# Patient Record
Sex: FEMALE | Race: WHITE | NOT HISPANIC OR LATINO | Employment: OTHER | URBAN - METROPOLITAN AREA
[De-identification: names, ages, dates, MRNs, and addresses within clinical notes are randomized per-mention and may not be internally consistent; named-entity substitution may affect disease eponyms.]

---

## 2017-01-16 ENCOUNTER — HOSPITAL ENCOUNTER (OUTPATIENT)
Dept: MAMMOGRAPHY | Facility: HOSPITAL | Age: 57
Discharge: HOME | End: 2017-01-16
Admitting: INTERNAL MEDICINE

## 2017-01-16 DIAGNOSIS — Z12.39 SCREENING BREAST EXAMINATION: ICD-10-CM

## 2017-01-16 PROCEDURE — G0202 SCR MAMMO BI INCL CAD: HCPCS

## 2017-06-05 ENCOUNTER — OFFICE VISIT (OUTPATIENT)
Dept: OBSTETRICS AND GYNECOLOGY | Age: 57
End: 2017-06-05

## 2017-06-05 VITALS
SYSTOLIC BLOOD PRESSURE: 118 MMHG | HEIGHT: 65 IN | BODY MASS INDEX: 25.66 KG/M2 | DIASTOLIC BLOOD PRESSURE: 70 MMHG | WEIGHT: 154 LBS

## 2017-06-05 DIAGNOSIS — Z01.419 ENCOUNTER FOR GYNECOLOGICAL EXAMINATION: Primary | ICD-10-CM

## 2017-06-05 DIAGNOSIS — N95.2 ATROPHIC VAGINITIS: ICD-10-CM

## 2017-06-05 LAB
BILIRUB BLD-MCNC: NEGATIVE MG/DL
CLARITY, POC: CLEAR
COLOR UR: YELLOW
GLUCOSE UR STRIP-MCNC: NEGATIVE MG/DL
KETONES UR QL: NEGATIVE
LEUKOCYTE EST, POC: NEGATIVE
NITRITE UR-MCNC: NEGATIVE MG/ML
PH UR: 6 [PH] (ref 5–8)
PROT UR STRIP-MCNC: NEGATIVE MG/DL
RBC # UR STRIP: NEGATIVE /UL
SP GR UR: 1.02 (ref 1–1.03)
UROBILINOGEN UR QL: NORMAL

## 2017-06-05 PROCEDURE — 81002 URINALYSIS NONAUTO W/O SCOPE: CPT | Performed by: OBSTETRICS & GYNECOLOGY

## 2017-06-05 PROCEDURE — 99396 PREV VISIT EST AGE 40-64: CPT | Performed by: OBSTETRICS & GYNECOLOGY

## 2017-06-05 NOTE — PROGRESS NOTES
"Subjective   Chief Complaint   Patient presents with   • Gynecologic Exam     Annual      History of Present Illness    Poly Molina is a very pleasant  57 y.o. female who presents for annual exam.  , Mammo Exam2016 [facility] , Contraception pm, Exercise 3-5 x wkly.Patient notices a little bit of decrease in lubrication over the past several years but does not want any medical treatment for that.  She had her colonoscopy last December, is exercising regularly, up-to-date on her wellness labs    Obstetric History:  OB History      Para Term  AB TAB SAB Ectopic Multiple Living    2 2 2                Menstrual History:     No LMP recorded. Patient is postmenopausal.       Sexual History:       Past Medical History:   Diagnosis Date   • Fibrocystic breast    • Increased prolactin level      Past Surgical History:   Procedure Laterality Date   • COLONOSCOPY N/A 2016    Procedure: COLONOSCOPY INTO CECUM;  Surgeon: Savanna Roberts MD;  Location: [facility] ENDOSCOPY;  Service:    • FOREARM SURGERY Right    • KNEE ACL RECONSTRUCTION Right        SOCIAL Hx:      The following portions of the patient's history were reviewed and updated as appropriate: allergies, current medications, past family history, past medical history, past social history, past surgical history and problem list.    Review of Systems        Except as outlined in history of physical illness, patient denies any changes in her GYN, , GI systems.  All other systems reviewed are negative         Objective   Physical Exam    /70  Ht 64.5\" (163.8 cm)  Wt 154 lb (69.9 kg)  BMI 26.03 kg/m2    General: Patient is alert and oriented and appears overall healthy  Neck: Is supple without thyromegaly, no carotid bruits and no lymphadenopathy  Lungs: Clear bilaterally, no wheezing, rhonchi, or rales.  Respiratory rate is normal  Breast: Even symmetrical, no lymphadenopathy, no retraction, no masses appreciated on either side  Heart: " Regular rate and rhythm are appreciated, no murmurs or rubs are heard  Abdomen: Is soft, without organomegaly, bowel sounds are positive, there is no                                rebound or guarding and palpation does not produce any discomfort  Back: Nontender without CVA tenderness  Pelvic: External genitalia appear normal and consistent with mature female.  BUS normal                            Vagina is clean dry without discharge and appears marginally estrogenized, no               lesions or masses are present                         Cervix is noninflamed without discharge or lesions.  There is no cervical motion             tenderness.                Uterus is nonenlarged, without tenderness, and no masses or abnormalities are  present               Adnexa are non-enlarged, non tender               Rectal exam reveals adequate sphincter tone and no masses or lesions are                     appreciated on digital rectal examination.     There is no problem list on file for this patient.               Assessment/Plan   Poly was seen today for gynecologic exam.    Diagnoses and all orders for this visit:    Encounter for gynecological examination  -     IGP, Apt HPV,rfx 16 / 18,45    Atrophic vaginitis  Will use Replens vaginal lubricant declines any other treatment at this stage    Annual Well Woman Exam    Discussed today's findings and concerns with patient in detail.  Continue to recommend regular exercise to include cardiovascular and resistance training and breast self-exam. Wellness lab, mammography, & pap smear, in accordance with age guidelines.  Dietary and caloric recommendations were discussed.    Frequency of bone density testing as well as colonoscopy given the patient's family and past history were reviewed.    All of the patient's questions were addressed and answered, I have encouraged her to call for today's test results if she has not received them within 10 days.  Patient is advised to  call with any change in her condition or with any other questions, otherwise return in 12 months for annual examination.           EMR Dragon/ Transcription disclaimer:  Much of the encounter note is an electronic transcription/translation of spoken language to printed text. The electronic translation of spoken language may permit erroneous, or at times, nonessential words or phrases to be inadvertently transcribes; Although i have reviewed the note for such errors, some may still exist.

## 2017-06-08 LAB
CYTOLOGIST CVX/VAG CYTO: NORMAL
CYTOLOGY CVX/VAG DOC THIN PREP: NORMAL
DX ICD CODE: NORMAL
HIV 1 & 2 AB SER-IMP: NORMAL
HPV I/H RISK 4 DNA CVX QL PROBE+SIG AMP: NEGATIVE
OTHER STN SPEC: NORMAL
PATH REPORT.FINAL DX SPEC: NORMAL
STAT OF ADQ CVX/VAG CYTO-IMP: NORMAL

## 2017-12-15 ENCOUNTER — LAB (OUTPATIENT)
Dept: LAB | Facility: HOSPITAL | Age: 57
End: 2017-12-15

## 2017-12-15 ENCOUNTER — TRANSCRIBE ORDERS (OUTPATIENT)
Dept: ADMINISTRATIVE | Facility: HOSPITAL | Age: 57
End: 2017-12-15

## 2017-12-15 DIAGNOSIS — Z00.00 ROUTINE GENERAL MEDICAL EXAMINATION AT A HEALTH CARE FACILITY: ICD-10-CM

## 2017-12-15 DIAGNOSIS — Z00.00 ROUTINE GENERAL MEDICAL EXAMINATION AT A HEALTH CARE FACILITY: Primary | ICD-10-CM

## 2017-12-15 LAB
ALBUMIN SERPL-MCNC: 4.1 G/DL (ref 3.5–5.2)
ALBUMIN/GLOB SERPL: 1.2 G/DL
ALP SERPL-CCNC: 62 U/L (ref 39–117)
ALT SERPL W P-5'-P-CCNC: 19 U/L (ref 1–33)
ANION GAP SERPL CALCULATED.3IONS-SCNC: 10.1 MMOL/L
AST SERPL-CCNC: 19 U/L (ref 1–32)
BACTERIA UR QL AUTO: ABNORMAL /HPF
BASOPHILS # BLD AUTO: 0.04 10*3/MM3 (ref 0–0.2)
BASOPHILS NFR BLD AUTO: 0.6 % (ref 0–1.5)
BILIRUB SERPL-MCNC: 0.3 MG/DL (ref 0.1–1.2)
BILIRUB UR QL STRIP: NEGATIVE
BUN BLD-MCNC: 14 MG/DL (ref 6–20)
BUN/CREAT SERPL: 16.9 (ref 7–25)
CALCIUM SPEC-SCNC: 9.6 MG/DL (ref 8.6–10.5)
CHLORIDE SERPL-SCNC: 103 MMOL/L (ref 98–107)
CHOLEST SERPL-MCNC: 225 MG/DL (ref 0–200)
CLARITY UR: CLEAR
CO2 SERPL-SCNC: 29.9 MMOL/L (ref 22–29)
COLOR UR: YELLOW
CREAT BLD-MCNC: 0.83 MG/DL (ref 0.57–1)
DEPRECATED RDW RBC AUTO: 44 FL (ref 37–54)
EOSINOPHIL # BLD AUTO: 0.16 10*3/MM3 (ref 0–0.7)
EOSINOPHIL NFR BLD AUTO: 2.4 % (ref 0.3–6.2)
ERYTHROCYTE [DISTWIDTH] IN BLOOD BY AUTOMATED COUNT: 13.2 % (ref 11.7–13)
GFR SERPL CREATININE-BSD FRML MDRD: 71 ML/MIN/1.73
GLOBULIN UR ELPH-MCNC: 3.4 GM/DL
GLUCOSE BLD-MCNC: 99 MG/DL (ref 65–99)
GLUCOSE UR STRIP-MCNC: NEGATIVE MG/DL
HCT VFR BLD AUTO: 42.2 % (ref 35.6–45.5)
HDLC SERPL-MCNC: 75 MG/DL (ref 40–60)
HGB BLD-MCNC: 13 G/DL (ref 11.9–15.5)
HGB UR QL STRIP.AUTO: NEGATIVE
HYALINE CASTS UR QL AUTO: ABNORMAL /LPF
IMM GRANULOCYTES # BLD: 0 10*3/MM3 (ref 0–0.03)
IMM GRANULOCYTES NFR BLD: 0 % (ref 0–0.5)
KETONES UR QL STRIP: NEGATIVE
LDLC SERPL CALC-MCNC: 138 MG/DL (ref 0–100)
LDLC/HDLC SERPL: 1.85 {RATIO}
LEUKOCYTE ESTERASE UR QL STRIP.AUTO: ABNORMAL
LYMPHOCYTES # BLD AUTO: 2.34 10*3/MM3 (ref 0.9–4.8)
LYMPHOCYTES NFR BLD AUTO: 35.5 % (ref 19.6–45.3)
MCH RBC QN AUTO: 28.4 PG (ref 26.9–32)
MCHC RBC AUTO-ENTMCNC: 30.8 G/DL (ref 32.4–36.3)
MCV RBC AUTO: 92.1 FL (ref 80.5–98.2)
MONOCYTES # BLD AUTO: 0.63 10*3/MM3 (ref 0.2–1.2)
MONOCYTES NFR BLD AUTO: 9.5 % (ref 5–12)
NEUTROPHILS # BLD AUTO: 3.43 10*3/MM3 (ref 1.9–8.1)
NEUTROPHILS NFR BLD AUTO: 52 % (ref 42.7–76)
NITRITE UR QL STRIP: NEGATIVE
PH UR STRIP.AUTO: 6 [PH] (ref 5–8)
PLATELET # BLD AUTO: 317 10*3/MM3 (ref 140–500)
PMV BLD AUTO: 9.6 FL (ref 6–12)
POTASSIUM BLD-SCNC: 4.2 MMOL/L (ref 3.5–5.2)
PROCALCITONIN SERPL-MCNC: 0.04 NG/ML (ref 0.1–0.25)
PROT SERPL-MCNC: 7.5 G/DL (ref 6–8.5)
PROT UR QL STRIP: ABNORMAL
RBC # BLD AUTO: 4.58 10*6/MM3 (ref 3.9–5.2)
RBC # UR: ABNORMAL /HPF
REF LAB TEST METHOD: ABNORMAL
SODIUM BLD-SCNC: 143 MMOL/L (ref 136–145)
SP GR UR STRIP: 1.02 (ref 1–1.03)
SQUAMOUS #/AREA URNS HPF: ABNORMAL /HPF
TRIGL SERPL-MCNC: 58 MG/DL (ref 0–150)
TSH SERPL DL<=0.05 MIU/L-ACNC: 2.41 MIU/ML (ref 0.27–4.2)
UROBILINOGEN UR QL STRIP: ABNORMAL
VLDLC SERPL-MCNC: 11.6 MG/DL (ref 5–40)
WBC NRBC COR # BLD: 6.6 10*3/MM3 (ref 4.5–10.7)
WBC UR QL AUTO: ABNORMAL /HPF

## 2017-12-15 PROCEDURE — 85025 COMPLETE CBC W/AUTO DIFF WBC: CPT | Performed by: INTERNAL MEDICINE

## 2017-12-15 PROCEDURE — 81001 URINALYSIS AUTO W/SCOPE: CPT | Performed by: INTERNAL MEDICINE

## 2017-12-15 PROCEDURE — 80053 COMPREHEN METABOLIC PANEL: CPT | Performed by: INTERNAL MEDICINE

## 2017-12-15 PROCEDURE — 84145 PROCALCITONIN (PCT): CPT | Performed by: INTERNAL MEDICINE

## 2017-12-15 PROCEDURE — 80061 LIPID PANEL: CPT | Performed by: INTERNAL MEDICINE

## 2017-12-15 PROCEDURE — 36415 COLL VENOUS BLD VENIPUNCTURE: CPT

## 2017-12-15 PROCEDURE — 84443 ASSAY THYROID STIM HORMONE: CPT | Performed by: INTERNAL MEDICINE

## 2017-12-19 ENCOUNTER — LAB (OUTPATIENT)
Dept: LAB | Facility: HOSPITAL | Age: 57
End: 2017-12-19

## 2017-12-19 ENCOUNTER — TRANSCRIBE ORDERS (OUTPATIENT)
Dept: ADMINISTRATIVE | Facility: HOSPITAL | Age: 57
End: 2017-12-19

## 2017-12-19 DIAGNOSIS — E22.1 HYPERPROLACTINEMIA (HCC): Primary | ICD-10-CM

## 2017-12-19 DIAGNOSIS — E22.1 HYPERPROLACTINEMIA (HCC): ICD-10-CM

## 2017-12-19 LAB — PROLACTIN SERPL-MCNC: 18.2 NG/ML (ref 4.79–23.3)

## 2017-12-19 PROCEDURE — 84146 ASSAY OF PROLACTIN: CPT | Performed by: INTERNAL MEDICINE

## 2017-12-19 PROCEDURE — 36415 COLL VENOUS BLD VENIPUNCTURE: CPT

## 2017-12-21 ENCOUNTER — TRANSCRIBE ORDERS (OUTPATIENT)
Dept: ADMINISTRATIVE | Facility: HOSPITAL | Age: 57
End: 2017-12-21

## 2017-12-21 DIAGNOSIS — Z12.39 SCREENING BREAST EXAMINATION: Primary | ICD-10-CM

## 2018-01-03 ENCOUNTER — HOSPITAL ENCOUNTER (OUTPATIENT)
Dept: MAMMOGRAPHY | Facility: HOSPITAL | Age: 58
Discharge: HOME | End: 2018-01-03
Admitting: INTERNAL MEDICINE

## 2018-01-03 DIAGNOSIS — Z12.39 SCREENING BREAST EXAMINATION: ICD-10-CM

## 2018-01-03 PROCEDURE — 77067 SCR MAMMO BI INCL CAD: CPT

## 2018-06-11 ENCOUNTER — OFFICE VISIT (OUTPATIENT)
Dept: OBSTETRICS AND GYNECOLOGY | Age: 58
End: 2018-06-11

## 2018-06-11 VITALS
HEIGHT: 65 IN | SYSTOLIC BLOOD PRESSURE: 120 MMHG | DIASTOLIC BLOOD PRESSURE: 72 MMHG | BODY MASS INDEX: 26.82 KG/M2 | WEIGHT: 161 LBS

## 2018-06-11 DIAGNOSIS — Z01.419 ENCOUNTER FOR GYNECOLOGICAL EXAMINATION: Primary | ICD-10-CM

## 2018-06-11 PROCEDURE — 99396 PREV VISIT EST AGE 40-64: CPT | Performed by: OBSTETRICS & GYNECOLOGY

## 2018-06-11 NOTE — PROGRESS NOTES
"Subjective   Chief Complaint   Patient presents with   • Gynecologic Exam     Annual:last mammo 2017      History of Present Illness    Poly Molina is a very pleasant  58 y.o. female who presents for annual exam.  , Mammo Exam 2017 within normal limits, Contraception post menopausal, Exercise 6 times a week  Patient has no gynecological concerns or complaints. She is up-to-date on her colonoscopy, she is up-to-date on her wellness labs. We discussed some of her dietary choices, overall she is doing well having a little difficulty limiting in her high glycemic foods and simple carbs.    Obstetric History:  OB History      Para Term  AB Living    2 2 2          SAB TAB Ectopic Molar Multiple Live Births                        Menstrual History:     No LMP recorded. Patient is postmenopausal.       Sexual History:       Past Medical History:   Diagnosis Date   • Fibrocystic breast    • Increased prolactin level      Past Surgical History:   Procedure Laterality Date   • COLONOSCOPY N/A 2016    Procedure: COLONOSCOPY INTO CECUM;  Surgeon: Savanna Roberts MD;  Location: [facility];  Service:    • FOREARM SURGERY Right    • KNEE ACL RECONSTRUCTION Right        SOCIAL Hx:      The following portions of the patient's history were reviewed and updated as appropriate: allergies, current medications, past family history, past medical history, past social history, past surgical history and problem list.    Review of Systems        Except as outlined in history of physical illness, patient denies any changes in her GYN, , GI systems.  All other systems reviewed are negative         Objective   Physical Exam    /72   Ht 163.8 cm (64.5\")   Wt 73 kg (161 lb)   BMI 27.21 kg/m²     General: Patient is alert and oriented and appears overall healthy  Neck: Is supple without thyromegaly, no carotid bruits and no lymphadenopathy  Lungs: Clear bilaterally, no wheezing, " rhonchi, or rales.  Respiratory rate is normal  Breast: Even symmetrical, no lymphadenopathy, no retraction, no masses appreciated on either side  Heart: Regular rate and rhythm are appreciated, no murmurs or rubs are heard  Abdomen: Is soft, without organomegaly, bowel sounds are positive, there is no                                rebound or guarding and palpation does not produce any discomfort  Back: Nontender without CVA tenderness  Pelvic: External genitalia appear normal and consistent with mature female.  BUS normal              Urethra appears normal and without mass, bladder is nontender and without any               Masses.               Vagina is clean dry without discharge and appears adequately estrogenized, no               lesions or masses are present                         Cervix is noninflamed without discharge or lesions.  There is no cervical motion             tenderness.                Uterus is nonenlarged, without tenderness, and no masses or abnormalities are  present               Adnexa are non-enlarged, non tender               Rectal exam reveals adequate sphincter tone and no masses or lesions are                     appreciated on digital rectal examination.     There is no problem list on file for this patient.               Assessment/Plan   Poly was seen today for gynecologic exam.    Diagnoses and all orders for this visit:    Encounter for gynecological examination  -     IGP, Apt HPV,rfx 16 / 18,45 - ThinPrep Vial, Cervix        Annual Well Woman Exam    Discussed today's findings and concerns with patient in detail.  Continue to recommend regular exercise to include cardiovascular and resistance training and breast self-exam. Wellness lab, mammography, & pap smear, in accordance with age guidelines.  Dietary and caloric recommendations were discussed.        All of the patient's questions were addressed and answered, I have encouraged her to call for today's test results if  she has not received them within 10 days.  Patient is advised to call with any change in her condition or with any other questions, otherwise return in 12 months for annual examination.

## 2018-06-14 LAB
CYTOLOGIST CVX/VAG CYTO: NORMAL
CYTOLOGY CVX/VAG DOC THIN PREP: NORMAL
DX ICD CODE: NORMAL
HIV 1 & 2 AB SER-IMP: NORMAL
HPV I/H RISK 4 DNA CVX QL PROBE+SIG AMP: NEGATIVE
Lab: NORMAL
OTHER STN SPEC: NORMAL
PATH REPORT.FINAL DX SPEC: NORMAL
STAT OF ADQ CVX/VAG CYTO-IMP: NORMAL

## 2019-01-29 ENCOUNTER — LAB (OUTPATIENT)
Dept: LAB | Facility: HOSPITAL | Age: 59
End: 2019-01-29

## 2019-01-29 ENCOUNTER — TRANSCRIBE ORDERS (OUTPATIENT)
Dept: ADMINISTRATIVE | Facility: HOSPITAL | Age: 59
End: 2019-01-29

## 2019-01-29 DIAGNOSIS — E22.1 IDIOPATHIC HYPERPROLACTINEMIA (HCC): ICD-10-CM

## 2019-01-29 DIAGNOSIS — Z00.00 ROUTINE GENERAL MEDICAL EXAMINATION AT A HEALTH CARE FACILITY: ICD-10-CM

## 2019-01-29 DIAGNOSIS — Z00.00 ROUTINE GENERAL MEDICAL EXAMINATION AT A HEALTH CARE FACILITY: Primary | ICD-10-CM

## 2019-01-29 LAB
ALBUMIN SERPL-MCNC: 4.4 G/DL (ref 3.5–5.2)
ALBUMIN/GLOB SERPL: 1.6 G/DL
ALP SERPL-CCNC: 66 U/L (ref 39–117)
ALT SERPL W P-5'-P-CCNC: 13 U/L (ref 1–33)
ANION GAP SERPL CALCULATED.3IONS-SCNC: 9.4 MMOL/L
AST SERPL-CCNC: 14 U/L (ref 1–32)
BASOPHILS # BLD AUTO: 0.02 10*3/MM3 (ref 0–0.2)
BASOPHILS NFR BLD AUTO: 0.3 % (ref 0–1.5)
BILIRUB SERPL-MCNC: 0.3 MG/DL (ref 0.1–1.2)
BILIRUB UR QL STRIP: NEGATIVE
BUN BLD-MCNC: 14 MG/DL (ref 8–23)
BUN/CREAT SERPL: 17.5 (ref 7–25)
CALCIUM SPEC-SCNC: 9.7 MG/DL (ref 8.6–10.5)
CHLORIDE SERPL-SCNC: 100 MMOL/L (ref 98–107)
CHOLEST SERPL-MCNC: 223 MG/DL (ref 0–200)
CLARITY UR: CLEAR
CO2 SERPL-SCNC: 28.6 MMOL/L (ref 22–29)
COLOR UR: YELLOW
CREAT BLD-MCNC: 0.8 MG/DL (ref 0.57–1)
DEPRECATED RDW RBC AUTO: 44.4 FL (ref 37–54)
EOSINOPHIL # BLD AUTO: 0.08 10*3/MM3 (ref 0–0.7)
EOSINOPHIL NFR BLD AUTO: 1.1 % (ref 0.3–6.2)
ERYTHROCYTE [DISTWIDTH] IN BLOOD BY AUTOMATED COUNT: 13.2 % (ref 11.7–13)
GFR SERPL CREATININE-BSD FRML MDRD: 73 ML/MIN/1.73
GLOBULIN UR ELPH-MCNC: 2.8 GM/DL
GLUCOSE BLD-MCNC: 92 MG/DL (ref 65–99)
GLUCOSE UR STRIP-MCNC: NEGATIVE MG/DL
HCT VFR BLD AUTO: 41.2 % (ref 35.6–45.5)
HDLC SERPL-MCNC: 71 MG/DL (ref 40–60)
HGB BLD-MCNC: 12.7 G/DL (ref 11.9–15.5)
HGB UR QL STRIP.AUTO: NEGATIVE
IMM GRANULOCYTES # BLD AUTO: 0.02 10*3/MM3 (ref 0–0.03)
IMM GRANULOCYTES NFR BLD AUTO: 0.3 % (ref 0–0.5)
KETONES UR QL STRIP: NEGATIVE
LDLC SERPL CALC-MCNC: 139 MG/DL (ref 0–100)
LDLC/HDLC SERPL: 1.96 {RATIO}
LEUKOCYTE ESTERASE UR QL STRIP.AUTO: NEGATIVE
LYMPHOCYTES # BLD AUTO: 2.2 10*3/MM3 (ref 0.9–4.8)
LYMPHOCYTES NFR BLD AUTO: 31.3 % (ref 19.6–45.3)
MCH RBC QN AUTO: 28.6 PG (ref 26.9–32)
MCHC RBC AUTO-ENTMCNC: 30.8 G/DL (ref 32.4–36.3)
MCV RBC AUTO: 92.8 FL (ref 80.5–98.2)
MONOCYTES # BLD AUTO: 0.5 10*3/MM3 (ref 0.2–1.2)
MONOCYTES NFR BLD AUTO: 7.1 % (ref 5–12)
NEUTROPHILS # BLD AUTO: 4.21 10*3/MM3 (ref 1.9–8.1)
NEUTROPHILS NFR BLD AUTO: 59.9 % (ref 42.7–76)
NITRITE UR QL STRIP: NEGATIVE
PH UR STRIP.AUTO: 7.5 [PH] (ref 5–8)
PLATELET # BLD AUTO: 359 10*3/MM3 (ref 140–500)
PMV BLD AUTO: 9 FL (ref 6–12)
POTASSIUM BLD-SCNC: 4 MMOL/L (ref 3.5–5.2)
PROLACTIN SERPL-MCNC: 15.22 NG/ML (ref 4.79–23.3)
PROT SERPL-MCNC: 7.2 G/DL (ref 6–8.5)
PROT UR QL STRIP: NEGATIVE
RBC # BLD AUTO: 4.44 10*6/MM3 (ref 3.9–5.2)
SODIUM BLD-SCNC: 138 MMOL/L (ref 136–145)
SP GR UR STRIP: 1.01 (ref 1–1.03)
TRIGL SERPL-MCNC: 63 MG/DL (ref 0–150)
UROBILINOGEN UR QL STRIP: NORMAL
VLDLC SERPL-MCNC: 12.6 MG/DL (ref 5–40)
WBC NRBC COR # BLD: 7.03 10*3/MM3 (ref 4.5–10.7)

## 2019-01-29 PROCEDURE — 85025 COMPLETE CBC W/AUTO DIFF WBC: CPT | Performed by: INTERNAL MEDICINE

## 2019-01-29 PROCEDURE — 84146 ASSAY OF PROLACTIN: CPT | Performed by: INTERNAL MEDICINE

## 2019-01-29 PROCEDURE — 80053 COMPREHEN METABOLIC PANEL: CPT | Performed by: INTERNAL MEDICINE

## 2019-01-29 PROCEDURE — 80061 LIPID PANEL: CPT | Performed by: INTERNAL MEDICINE

## 2019-01-29 PROCEDURE — 36415 COLL VENOUS BLD VENIPUNCTURE: CPT

## 2019-01-29 PROCEDURE — 81003 URINALYSIS AUTO W/O SCOPE: CPT | Performed by: INTERNAL MEDICINE

## 2019-06-19 ENCOUNTER — OFFICE VISIT (OUTPATIENT)
Dept: OBSTETRICS AND GYNECOLOGY | Age: 59
End: 2019-06-19

## 2019-06-19 VITALS
BODY MASS INDEX: 25.49 KG/M2 | SYSTOLIC BLOOD PRESSURE: 120 MMHG | DIASTOLIC BLOOD PRESSURE: 72 MMHG | WEIGHT: 153 LBS | HEIGHT: 65 IN

## 2019-06-19 DIAGNOSIS — Z01.419 ENCOUNTER FOR GYNECOLOGICAL EXAMINATION: Primary | ICD-10-CM

## 2019-06-19 PROCEDURE — 99396 PREV VISIT EST AGE 40-64: CPT | Performed by: OBSTETRICS & GYNECOLOGY

## 2019-06-19 NOTE — PROGRESS NOTES
"Subjective   Chief Complaint   Patient presents with   • Gynecologic Exam     Annual:last pap 2018,mammo 2018,colonoscopy       History of Present Illness    Poly Molina is a very pleasant  59 y.o. female who presents for annual exam.  , Mammo Exam patient is planning to schedule later this summer, , Exercise 6 times a week including resistance and mostly cardio  Patient is postmenopausal has no gynecological concerns or complaints she is up-to-date on her colonoscopy and is getting her wellness labs with her PCP.    Obstetric History:  OB History      Para Term  AB Living    2 2 2          SAB TAB Ectopic Molar Multiple Live Births                        Menstrual History:     No LMP recorded. Patient is postmenopausal.       Sexual History:       Past Medical History:   Diagnosis Date   • Fibrocystic breast    • Increased prolactin level (CMS/HCC)      Past Surgical History:   Procedure Laterality Date   • COLONOSCOPY N/A 2016    Procedure: COLONOSCOPY INTO CECUM;  Surgeon: Savanna Roberts MD;  Location: [facility] ENDOSCOPY;  Service:    • FOREARM SURGERY Right    • KNEE ACL RECONSTRUCTION Right        SOCIAL Hx:      The following portions of the patient's history were reviewed and updated as appropriate: allergies, current medications, past family history, past medical history, past social history, past surgical history and problem list.    Review of Systems        Except as outlined in history of physical illness, patient denies any changes in her GYN, , GI systems.  All other systems reviewed are negative         Objective   Physical Exam    /72   Ht 163.8 cm (64.5\")   Wt 69.4 kg (153 lb)   BMI 25.86 kg/m²     General: Patient is alert and oriented and appears overall healthy  Neck: Is supple without thyromegaly, no carotid bruits and no lymphadenopathy  Lungs: Clear bilaterally, no wheezing, rhonchi, or rales.  Respiratory rate is normal  Breast: Even " symmetrical, no lymphadenopathy, no retraction, no masses appreciated on either side  Heart: Regular rate and rhythm are appreciated, no murmurs or rubs are heard  Abdomen: Is soft, without organomegaly, bowel sounds are positive, there is no                                rebound or guarding and palpation does not produce any discomfort  Back: Nontender without CVA tenderness  Pelvic: External genitalia appear normal and consistent with mature female.  BUS normal              Urethra appears normal and without mass, bladder is nontender and without any               Masses.               Vagina is clean dry without discharge and appears adequately estrogenized, no               lesions or masses are present                         Cervix is noninflamed without discharge or lesions.  There is no cervical motion             tenderness.                Uterus is nonenlarged, without tenderness, and no masses or abnormalities are  present               Adnexa are non-enlarged, non tender               Rectal exam reveals adequate sphincter tone and no masses or lesions are                     appreciated on digital rectal examination.      Annual Well Woman Exam     There is no problem list on file for this patient.               Assessment/Plan   Poly was seen today for gynecologic exam.    Diagnoses and all orders for this visit:    Encounter for gynecological examination  -     IGP, Apt HPV,rfx 16 / 18,45          Discussed today's findings and concerns with patient.  Continue to recommend regular exercise including cardiovascular and resistance training as well as  breast self-exam. Wellness lab, mammography, & pap smear, in accordance with age guidelines.        All of the patient's questions were addressed and answered, I have encouraged her to call for today's test results if she has not received them within 10 days.  Patient is advised to call with any change in her condition or with any other questions,  otherwise return in 12 months for annual examination.

## 2019-06-21 LAB
CYTOLOGIST CVX/VAG CYTO: NORMAL
CYTOLOGY CVX/VAG DOC CYTO: NORMAL
CYTOLOGY CVX/VAG DOC THIN PREP: NORMAL
DX ICD CODE: NORMAL
HIV 1 & 2 AB SER-IMP: NORMAL
HPV I/H RISK 4 DNA CVX QL PROBE+SIG AMP: NEGATIVE
OTHER STN SPEC: NORMAL
STAT OF ADQ CVX/VAG CYTO-IMP: NORMAL

## 2019-11-13 ENCOUNTER — LAB (OUTPATIENT)
Dept: LAB | Facility: HOSPITAL | Age: 59
End: 2019-11-13

## 2019-11-13 ENCOUNTER — TRANSCRIBE ORDERS (OUTPATIENT)
Dept: ADMINISTRATIVE | Facility: HOSPITAL | Age: 59
End: 2019-11-13

## 2019-11-13 DIAGNOSIS — E78.5 HYPERLIPIDEMIA, UNSPECIFIED HYPERLIPIDEMIA TYPE: ICD-10-CM

## 2019-11-13 DIAGNOSIS — E78.5 HYPERLIPIDEMIA, UNSPECIFIED HYPERLIPIDEMIA TYPE: Primary | ICD-10-CM

## 2019-11-13 LAB
ALBUMIN SERPL-MCNC: 4.4 G/DL (ref 3.5–5.2)
ALBUMIN/GLOB SERPL: 1.6 G/DL
ALP SERPL-CCNC: 67 U/L (ref 39–117)
ALT SERPL W P-5'-P-CCNC: 7 U/L (ref 1–33)
ANION GAP SERPL CALCULATED.3IONS-SCNC: 11.6 MMOL/L (ref 5–15)
AST SERPL-CCNC: 12 U/L (ref 1–32)
BILIRUB SERPL-MCNC: 0.4 MG/DL (ref 0.2–1.2)
BUN BLD-MCNC: 7 MG/DL (ref 8–23)
BUN/CREAT SERPL: 8.1 (ref 7–25)
CALCIUM SPEC-SCNC: 9.6 MG/DL (ref 8.6–10.5)
CHLORIDE SERPL-SCNC: 106 MMOL/L (ref 98–107)
CHOLEST SERPL-MCNC: 185 MG/DL (ref 0–200)
CO2 SERPL-SCNC: 29.4 MMOL/L (ref 22–29)
CREAT BLD-MCNC: 0.86 MG/DL (ref 0.57–1)
GFR SERPL CREATININE-BSD FRML MDRD: 67 ML/MIN/1.73
GLOBULIN UR ELPH-MCNC: 2.8 GM/DL
GLUCOSE BLD-MCNC: 101 MG/DL (ref 65–99)
HDLC SERPL-MCNC: 54 MG/DL (ref 40–60)
LDLC SERPL CALC-MCNC: 116 MG/DL (ref 0–100)
LDLC/HDLC SERPL: 2.16 {RATIO}
POTASSIUM BLD-SCNC: 4.3 MMOL/L (ref 3.5–5.2)
PROT SERPL-MCNC: 7.2 G/DL (ref 6–8.5)
SODIUM BLD-SCNC: 147 MMOL/L (ref 136–145)
TRIGL SERPL-MCNC: 73 MG/DL (ref 0–150)
VLDLC SERPL-MCNC: 14.6 MG/DL (ref 5–40)

## 2019-11-13 PROCEDURE — 80053 COMPREHEN METABOLIC PANEL: CPT | Performed by: INTERNAL MEDICINE

## 2019-11-13 PROCEDURE — 36415 COLL VENOUS BLD VENIPUNCTURE: CPT

## 2019-11-13 PROCEDURE — 80061 LIPID PANEL: CPT | Performed by: INTERNAL MEDICINE

## 2020-05-18 ENCOUNTER — TRANSCRIBE ORDERS (OUTPATIENT)
Dept: ADMINISTRATIVE | Facility: HOSPITAL | Age: 60
End: 2020-05-18

## 2020-05-18 ENCOUNTER — LAB (OUTPATIENT)
Dept: LAB | Facility: HOSPITAL | Age: 60
End: 2020-05-18

## 2020-05-18 DIAGNOSIS — E78.5 HYPERLIPIDEMIA, UNSPECIFIED HYPERLIPIDEMIA TYPE: ICD-10-CM

## 2020-05-18 DIAGNOSIS — Z00.00 ROUTINE GENERAL MEDICAL EXAMINATION AT A HEALTH CARE FACILITY: ICD-10-CM

## 2020-05-18 DIAGNOSIS — D35.2 PROLACTINOMA (HCC): ICD-10-CM

## 2020-05-18 DIAGNOSIS — E55.9 VITAMIN D DEFICIENCY: ICD-10-CM

## 2020-05-18 DIAGNOSIS — Z00.00 ROUTINE GENERAL MEDICAL EXAMINATION AT A HEALTH CARE FACILITY: Primary | ICD-10-CM

## 2020-05-18 LAB
25(OH)D3 SERPL-MCNC: 17.4 NG/ML (ref 30–100)
ALBUMIN SERPL-MCNC: 4.3 G/DL (ref 3.5–5.2)
ALBUMIN/GLOB SERPL: 1.5 G/DL
ALP SERPL-CCNC: 62 U/L (ref 39–117)
ALT SERPL W P-5'-P-CCNC: 14 U/L (ref 1–33)
ANION GAP SERPL CALCULATED.3IONS-SCNC: 10.3 MMOL/L (ref 5–15)
AST SERPL-CCNC: 17 U/L (ref 1–32)
BACTERIA UR QL AUTO: ABNORMAL /HPF
BASOPHILS # BLD AUTO: 0.03 10*3/MM3 (ref 0–0.2)
BASOPHILS NFR BLD AUTO: 0.4 % (ref 0–1.5)
BILIRUB SERPL-MCNC: 0.3 MG/DL (ref 0.2–1.2)
BILIRUB UR QL STRIP: NEGATIVE
BUN BLD-MCNC: 12 MG/DL (ref 8–23)
BUN/CREAT SERPL: 16.9 (ref 7–25)
CALCIUM SPEC-SCNC: 9.6 MG/DL (ref 8.6–10.5)
CHLORIDE SERPL-SCNC: 100 MMOL/L (ref 98–107)
CHOLEST SERPL-MCNC: 184 MG/DL (ref 0–200)
CLARITY UR: CLEAR
CO2 SERPL-SCNC: 28.7 MMOL/L (ref 22–29)
COLOR UR: YELLOW
CREAT BLD-MCNC: 0.71 MG/DL (ref 0.57–1)
DEPRECATED RDW RBC AUTO: 37.7 FL (ref 37–54)
EOSINOPHIL # BLD AUTO: 0.08 10*3/MM3 (ref 0–0.4)
EOSINOPHIL NFR BLD AUTO: 1.1 % (ref 0.3–6.2)
ERYTHROCYTE [DISTWIDTH] IN BLOOD BY AUTOMATED COUNT: 11.9 % (ref 12.3–15.4)
GFR SERPL CREATININE-BSD FRML MDRD: 84 ML/MIN/1.73
GLOBULIN UR ELPH-MCNC: 2.8 GM/DL
GLUCOSE BLD-MCNC: 102 MG/DL (ref 65–99)
GLUCOSE UR STRIP-MCNC: NEGATIVE MG/DL
HCT VFR BLD AUTO: 37.5 % (ref 34–46.6)
HDLC SERPL-MCNC: 60 MG/DL (ref 40–60)
HGB BLD-MCNC: 12.6 G/DL (ref 12–15.9)
HGB UR QL STRIP.AUTO: NEGATIVE
HYALINE CASTS UR QL AUTO: ABNORMAL /LPF
IMM GRANULOCYTES # BLD AUTO: 0.02 10*3/MM3 (ref 0–0.05)
IMM GRANULOCYTES NFR BLD AUTO: 0.3 % (ref 0–0.5)
KETONES UR QL STRIP: NEGATIVE
LDLC SERPL CALC-MCNC: 111 MG/DL (ref 0–100)
LDLC/HDLC SERPL: 1.86 {RATIO}
LEUKOCYTE ESTERASE UR QL STRIP.AUTO: ABNORMAL
LYMPHOCYTES # BLD AUTO: 1.99 10*3/MM3 (ref 0.7–3.1)
LYMPHOCYTES NFR BLD AUTO: 26.4 % (ref 19.6–45.3)
MCH RBC QN AUTO: 29.2 PG (ref 26.6–33)
MCHC RBC AUTO-ENTMCNC: 33.6 G/DL (ref 31.5–35.7)
MCV RBC AUTO: 86.8 FL (ref 79–97)
MONOCYTES # BLD AUTO: 0.67 10*3/MM3 (ref 0.1–0.9)
MONOCYTES NFR BLD AUTO: 8.9 % (ref 5–12)
NEUTROPHILS # BLD AUTO: 4.74 10*3/MM3 (ref 1.7–7)
NEUTROPHILS NFR BLD AUTO: 62.9 % (ref 42.7–76)
NITRITE UR QL STRIP: NEGATIVE
NRBC BLD AUTO-RTO: 0 /100 WBC (ref 0–0.2)
PH UR STRIP.AUTO: 5.5 [PH] (ref 5–8)
PLATELET # BLD AUTO: 337 10*3/MM3 (ref 140–450)
PMV BLD AUTO: 9.5 FL (ref 6–12)
POTASSIUM BLD-SCNC: 4.4 MMOL/L (ref 3.5–5.2)
PROLACTIN SERPL-MCNC: 17 NG/ML (ref 4.79–23.3)
PROT SERPL-MCNC: 7.1 G/DL (ref 6–8.5)
PROT UR QL STRIP: NEGATIVE
RBC # BLD AUTO: 4.32 10*6/MM3 (ref 3.77–5.28)
RBC # UR: ABNORMAL /HPF
REF LAB TEST METHOD: ABNORMAL
SODIUM BLD-SCNC: 139 MMOL/L (ref 136–145)
SP GR UR STRIP: 1.02 (ref 1–1.03)
SQUAMOUS #/AREA URNS HPF: ABNORMAL /HPF
TRIGL SERPL-MCNC: 63 MG/DL (ref 0–150)
UROBILINOGEN UR QL STRIP: ABNORMAL
VLDLC SERPL-MCNC: 12.6 MG/DL (ref 5–40)
WBC NRBC COR # BLD: 7.53 10*3/MM3 (ref 3.4–10.8)
WBC UR QL AUTO: ABNORMAL /HPF

## 2020-05-18 PROCEDURE — 36415 COLL VENOUS BLD VENIPUNCTURE: CPT

## 2020-05-18 PROCEDURE — 80061 LIPID PANEL: CPT | Performed by: INTERNAL MEDICINE

## 2020-05-18 PROCEDURE — 81001 URINALYSIS AUTO W/SCOPE: CPT | Performed by: INTERNAL MEDICINE

## 2020-05-18 PROCEDURE — 85025 COMPLETE CBC W/AUTO DIFF WBC: CPT | Performed by: INTERNAL MEDICINE

## 2020-05-18 PROCEDURE — 84146 ASSAY OF PROLACTIN: CPT | Performed by: INTERNAL MEDICINE

## 2020-05-18 PROCEDURE — 82306 VITAMIN D 25 HYDROXY: CPT | Performed by: INTERNAL MEDICINE

## 2020-05-18 PROCEDURE — 80053 COMPREHEN METABOLIC PANEL: CPT | Performed by: INTERNAL MEDICINE

## 2020-06-01 ENCOUNTER — TRANSCRIBE ORDERS (OUTPATIENT)
Dept: ADMINISTRATIVE | Facility: HOSPITAL | Age: 60
End: 2020-06-01

## 2020-06-01 DIAGNOSIS — R19.09 ABDOMINAL MASS OF OTHER SITE: Primary | ICD-10-CM

## 2020-06-01 DIAGNOSIS — Z12.31 VISIT FOR SCREENING MAMMOGRAM: ICD-10-CM

## 2020-06-03 ENCOUNTER — HOSPITAL ENCOUNTER (OUTPATIENT)
Dept: ULTRASOUND IMAGING | Facility: HOSPITAL | Age: 60
Discharge: HOME | End: 2020-06-03
Admitting: INTERNAL MEDICINE

## 2020-06-03 ENCOUNTER — TRANSCRIBE ORDERS (OUTPATIENT)
Dept: ADMINISTRATIVE | Facility: HOSPITAL | Age: 60
End: 2020-06-03

## 2020-06-03 ENCOUNTER — APPOINTMENT (OUTPATIENT)
Dept: LAB | Facility: HOSPITAL | Age: 60
End: 2020-06-03

## 2020-06-03 DIAGNOSIS — R19.09 ABDOMINAL MASS OF OTHER SITE: ICD-10-CM

## 2020-06-03 DIAGNOSIS — E55.9 VITAMIN D DEFICIENCY: Primary | ICD-10-CM

## 2020-06-03 PROCEDURE — 76705 ECHO EXAM OF ABDOMEN: CPT

## 2020-06-04 ENCOUNTER — LAB (OUTPATIENT)
Dept: LAB | Facility: HOSPITAL | Age: 60
End: 2020-06-04

## 2020-06-04 ENCOUNTER — TRANSCRIBE ORDERS (OUTPATIENT)
Dept: ADMINISTRATIVE | Facility: HOSPITAL | Age: 60
End: 2020-06-04

## 2020-06-04 DIAGNOSIS — R31.29 HEMATURIA, MICROSCOPIC: ICD-10-CM

## 2020-06-04 DIAGNOSIS — R31.29 HEMATURIA, MICROSCOPIC: Primary | ICD-10-CM

## 2020-06-04 LAB
BACTERIA UR QL AUTO: NORMAL /HPF
BILIRUB UR QL STRIP: NEGATIVE
CLARITY UR: CLEAR
COLOR UR: YELLOW
GLUCOSE UR STRIP-MCNC: NEGATIVE MG/DL
HGB UR QL STRIP.AUTO: NEGATIVE
HYALINE CASTS UR QL AUTO: NORMAL /LPF
KETONES UR QL STRIP: NEGATIVE
LEUKOCYTE ESTERASE UR QL STRIP.AUTO: NEGATIVE
NITRITE UR QL STRIP: NEGATIVE
PH UR STRIP.AUTO: 7.5 [PH] (ref 5–8)
PROT UR QL STRIP: NEGATIVE
RBC # UR: NORMAL /HPF
REF LAB TEST METHOD: NORMAL
SP GR UR STRIP: 1.01 (ref 1–1.03)
SQUAMOUS #/AREA URNS HPF: NORMAL /HPF
UROBILINOGEN UR QL STRIP: NORMAL
WBC UR QL AUTO: NORMAL /HPF

## 2020-06-04 PROCEDURE — 81001 URINALYSIS AUTO W/SCOPE: CPT | Performed by: INTERNAL MEDICINE

## 2020-06-22 ENCOUNTER — OFFICE VISIT (OUTPATIENT)
Dept: OBSTETRICS AND GYNECOLOGY | Age: 60
End: 2020-06-22

## 2020-06-22 VITALS
BODY MASS INDEX: 23.82 KG/M2 | WEIGHT: 143 LBS | SYSTOLIC BLOOD PRESSURE: 108 MMHG | HEIGHT: 65 IN | DIASTOLIC BLOOD PRESSURE: 70 MMHG

## 2020-06-22 DIAGNOSIS — Z01.419 ENCOUNTER FOR GYNECOLOGICAL EXAMINATION: Primary | ICD-10-CM

## 2020-06-22 PROCEDURE — 99396 PREV VISIT EST AGE 40-64: CPT | Performed by: OBSTETRICS & GYNECOLOGY

## 2020-06-22 RX ORDER — CHOLECALCIFEROL (VITAMIN D3) 1250 MCG
50000 CAPSULE ORAL
COMMUNITY
Start: 2020-06-02

## 2020-06-22 NOTE — PROGRESS NOTES
"Subjective   Chief Complaint   Patient presents with   • Gynecologic Exam     Annual:last pap ,mammo ,Appt[facility]next week @[facility],colonoscopy       History of Present Illness    Poly Molina is a very pleasant  60 y.o. female who presents for annual exam.  , Mammo Exam scheduled next week, , Exercise 5-6 times a week including resistance and cardio    Patient is postmenopausal without any gynecological concerns or complaints    She is changed to mostly a vegetarian diet no dairy and seems to be doing well lost some weight and likes her results    She is receiving her wellness labs with her PCP she is up-to-date on her colonoscopy and DEXA..    Obstetric History:  OB History        2    Para   2    Term   2            AB        Living           SAB        TAB        Ectopic        Molar        Multiple        Live Births                   Menstrual History:     No LMP recorded. Patient is postmenopausal.       Sexual History:       Past Medical History:   Diagnosis Date   • Fibrocystic breast    • Increased prolactin level      Past Surgical History:   Procedure Laterality Date   • COLONOSCOPY N/A 2016    Procedure: COLONOSCOPY INTO CECUM;  Surgeon: Savanna Roberts MD;  Location: [facility] ENDOSCOPY;  Service:    • FOREARM SURGERY Right    • KNEE ACL RECONSTRUCTION Right        SOCIAL Hx:      The following portions of the patient's history were reviewed and updated as appropriate: allergies, current medications, past family history, past medical history, past social history, past surgical history and problem list.    Review of Systems        Except as outlined in history of physical illness, patient denies any changes in her GYN, , GI systems.  All other systems reviewed are negative         Objective   Physical Exam    /70   Ht 163.8 cm (64.5\")   Wt 64.9 kg (143 lb)   Breastfeeding No   BMI 24.17 kg/m²     General: Patient is alert and oriented and appears overall " healthy  Neck: Is supple without thyromegaly, no carotid bruits and no lymphadenopathy  Lungs: Clear bilaterally, no wheezing, rhonchi, or rales.  Respiratory rate is normal  Breast: Even symmetrical, no lymphadenopathy, no retraction, no masses appreciated on either side  Heart: Regular rate and rhythm are appreciated, no murmurs or rubs are heard  Abdomen: Is soft, without organomegaly, bowel sounds are positive, there is no                                rebound or guarding and palpation does not produce any discomfort  Back: Nontender without CVA tenderness  Pelvic: External genitalia appear normal and consistent with mature female.  BUS normal              Urethra appears normal and without mass, bladder is nontender and without any               Masses.               Vagina is clean dry without discharge and appears adequately estrogenized, no               lesions or masses are present                         Cervix is noninflamed without discharge or lesions.  There is no cervical motion             tenderness.                Uterus is nonenlarged, without tenderness, and no masses or abnormalities are  present               Adnexa are non-enlarged, non tender               Rectal exam reveals adequate sphincter tone and no masses or lesions are                     appreciated on digital rectal examination.      Annual Well Woman Exam     There is no problem list on file for this patient.               Assessment/Plan   Poly was seen today for gynecologic exam.    Diagnoses and all orders for this visit:    Encounter for gynecological examination  -     IGP, Apt HPV,rfx 16 / 18,45    Mammogram scheduled at [facility] in [place] next week    Discussed in detail her vegetarian diet    Low on vitamin D and is starting to take that.      Discussed today's findings and concerns with patient.  Continue to recommend regular exercise including cardiovascular and resistance training as well as  breast  self-exam. Wellness lab, mammography, & pap smear, in accordance with age guidelines.    I have encouraged her to call for today's test results if she has not received them within 10 days.  Patient is advised to call with any change in her condition or with any other questions, otherwise return  for annual examination.

## 2020-06-30 ENCOUNTER — HOSPITAL ENCOUNTER (OUTPATIENT)
Dept: MAMMOGRAPHY | Facility: HOSPITAL | Age: 60
Discharge: HOME | End: 2020-06-30
Admitting: INTERNAL MEDICINE

## 2020-06-30 DIAGNOSIS — Z12.31 VISIT FOR SCREENING MAMMOGRAM: ICD-10-CM

## 2020-06-30 PROCEDURE — 77063 BREAST TOMOSYNTHESIS BI: CPT

## 2020-06-30 PROCEDURE — 77067 SCR MAMMO BI INCL CAD: CPT

## 2020-08-17 ENCOUNTER — HOSPITAL ENCOUNTER (OUTPATIENT)
Dept: GENERAL RADIOLOGY | Facility: HOSPITAL | Age: 60
Discharge: HOME | End: 2020-08-17
Admitting: INTERNAL MEDICINE

## 2020-08-17 ENCOUNTER — TRANSCRIBE ORDERS (OUTPATIENT)
Dept: GENERAL RADIOLOGY | Facility: HOSPITAL | Age: 60
End: 2020-08-17

## 2020-08-17 ENCOUNTER — LAB (OUTPATIENT)
Dept: LAB | Facility: HOSPITAL | Age: 60
End: 2020-08-17

## 2020-08-17 DIAGNOSIS — E55.9 VITAMIN D DEFICIENCY: ICD-10-CM

## 2020-08-17 DIAGNOSIS — M54.40 LOW BACK PAIN WITH SCIATICA, SCIATICA LATERALITY UNSPECIFIED, UNSPECIFIED BACK PAIN LATERALITY, UNSPECIFIED CHRONICITY: Primary | ICD-10-CM

## 2020-08-17 DIAGNOSIS — M54.40 LOW BACK PAIN WITH SCIATICA, SCIATICA LATERALITY UNSPECIFIED, UNSPECIFIED BACK PAIN LATERALITY, UNSPECIFIED CHRONICITY: ICD-10-CM

## 2020-08-17 PROCEDURE — 72110 X-RAY EXAM L-2 SPINE 4/>VWS: CPT

## 2020-08-28 ENCOUNTER — TELEPHONE (OUTPATIENT)
Dept: NEUROSURGERY | Facility: CLINIC | Age: 60
End: 2020-08-28

## 2020-08-28 DIAGNOSIS — S32.030A COMPRESSION FRACTURE OF L3 VERTEBRA, INITIAL ENCOUNTER (HCC): Primary | ICD-10-CM

## 2020-08-28 NOTE — TELEPHONE ENCOUNTER
----- Message from Lashaun Sellers MA sent at 8/28/2020 11:35 AM EDT -----  Dx: L3 compression fracture    L-spine xray    Please schedule with Dr. Lorenzo.

## 2020-08-31 ENCOUNTER — LAB (OUTPATIENT)
Dept: LAB | Facility: HOSPITAL | Age: 60
End: 2020-08-31

## 2020-08-31 LAB — 25(OH)D3 SERPL-MCNC: 80.4 NG/ML (ref 30–100)

## 2020-08-31 PROCEDURE — 82306 VITAMIN D 25 HYDROXY: CPT | Performed by: INTERNAL MEDICINE

## 2020-08-31 PROCEDURE — 36415 COLL VENOUS BLD VENIPUNCTURE: CPT
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
## 2020-09-10 ENCOUNTER — OFFICE VISIT (OUTPATIENT)
Dept: NEUROSURGERY | Facility: CLINIC | Age: 60
End: 2020-09-10

## 2020-09-10 VITALS
HEIGHT: 64 IN | TEMPERATURE: 98.2 F | HEART RATE: 76 BPM | DIASTOLIC BLOOD PRESSURE: 70 MMHG | BODY MASS INDEX: 24.59 KG/M2 | WEIGHT: 144 LBS | SYSTOLIC BLOOD PRESSURE: 122 MMHG

## 2020-09-10 DIAGNOSIS — S32.030A COMPRESSION FRACTURE OF L3 VERTEBRA, INITIAL ENCOUNTER (HCC): Primary | ICD-10-CM

## 2020-09-10 PROCEDURE — 99202 OFFICE O/P NEW SF 15 MIN: CPT | Performed by: RADIOLOGY

## 2020-09-10 RX ORDER — CYCLOBENZAPRINE HCL 10 MG
10 TABLET ORAL AS NEEDED
COMMUNITY
Start: 2020-08-17

## 2020-09-10 RX ORDER — LANOLIN ALCOHOL/MO/W.PET/CERES
500 CREAM (GRAM) TOPICAL DAILY
COMMUNITY

## 2020-09-16 ENCOUNTER — TRANSCRIBE ORDERS (OUTPATIENT)
Dept: ADMINISTRATIVE | Facility: HOSPITAL | Age: 60
End: 2020-09-16

## 2020-09-16 DIAGNOSIS — Z78.0 POST-MENOPAUSAL: Primary | ICD-10-CM

## 2020-09-29 ENCOUNTER — HOSPITAL ENCOUNTER (OUTPATIENT)
Dept: BONE DENSITY | Facility: HOSPITAL | Age: 60
Discharge: HOME | End: 2020-09-29
Admitting: INTERNAL MEDICINE

## 2020-09-29 DIAGNOSIS — Z78.0 POST-MENOPAUSAL: ICD-10-CM

## 2020-09-29 PROCEDURE — 77080 DXA BONE DENSITY AXIAL: CPT

## 2020-10-01 ENCOUNTER — TRANSCRIBE ORDERS (OUTPATIENT)
Dept: ADMINISTRATIVE | Facility: HOSPITAL | Age: 60
End: 2020-10-01

## 2020-10-01 ENCOUNTER — LAB (OUTPATIENT)
Dept: LAB | Facility: HOSPITAL | Age: 60
End: 2020-10-01

## 2020-10-01 DIAGNOSIS — M81.8 OSTEOPOROSIS AND OCULOCUTANEOUS HYPOPIGMENTATION SYNDROME: ICD-10-CM

## 2020-10-01 DIAGNOSIS — L81.8 OSTEOPOROSIS AND OCULOCUTANEOUS HYPOPIGMENTATION SYNDROME: Primary | ICD-10-CM

## 2020-10-01 DIAGNOSIS — L81.8 OSTEOPOROSIS AND OCULOCUTANEOUS HYPOPIGMENTATION SYNDROME: ICD-10-CM

## 2020-10-01 DIAGNOSIS — Q87.89 OSTEOPOROSIS AND OCULOCUTANEOUS HYPOPIGMENTATION SYNDROME: ICD-10-CM

## 2020-10-01 DIAGNOSIS — M81.8 OSTEOPOROSIS AND OCULOCUTANEOUS HYPOPIGMENTATION SYNDROME: Primary | ICD-10-CM

## 2020-10-01 DIAGNOSIS — Q87.89 OSTEOPOROSIS AND OCULOCUTANEOUS HYPOPIGMENTATION SYNDROME: Primary | ICD-10-CM

## 2020-10-01 LAB
ALBUMIN SERPL-MCNC: 4 G/DL (ref 3.5–5.2)
ALBUMIN/GLOB SERPL: 1.5 G/DL
ALP SERPL-CCNC: 66 U/L (ref 39–117)
ALT SERPL W P-5'-P-CCNC: 15 U/L (ref 1–33)
ANION GAP SERPL CALCULATED.3IONS-SCNC: 11.1 MMOL/L (ref 5–15)
AST SERPL-CCNC: 10 U/L (ref 1–32)
BILIRUB SERPL-MCNC: 0.4 MG/DL (ref 0–1.2)
BUN SERPL-MCNC: 10 MG/DL (ref 8–23)
BUN/CREAT SERPL: 12.8 (ref 7–25)
CALCIUM SPEC-SCNC: 10 MG/DL (ref 8.6–10.5)
CHLORIDE SERPL-SCNC: 101 MMOL/L (ref 98–107)
CO2 SERPL-SCNC: 27.9 MMOL/L (ref 22–29)
CREAT SERPL-MCNC: 0.78 MG/DL (ref 0.57–1)
GFR SERPL CREATININE-BSD FRML MDRD: 75 ML/MIN/1.73
GLOBULIN UR ELPH-MCNC: 2.7 GM/DL
GLUCOSE SERPL-MCNC: 96 MG/DL (ref 65–99)
POTASSIUM SERPL-SCNC: 4.2 MMOL/L (ref 3.5–5.2)
PROT SERPL-MCNC: 6.7 G/DL (ref 6–8.5)
SODIUM SERPL-SCNC: 140 MMOL/L (ref 136–145)

## 2020-10-01 PROCEDURE — 80053 COMPREHEN METABOLIC PANEL: CPT | Performed by: INTERNAL MEDICINE

## 2020-10-01 PROCEDURE — 36415 COLL VENOUS BLD VENIPUNCTURE: CPT

## 2020-10-06 ENCOUNTER — TRANSCRIBE ORDERS (OUTPATIENT)
Dept: ADMINISTRATIVE | Facility: HOSPITAL | Age: 60
End: 2020-10-06

## 2020-10-06 DIAGNOSIS — M81.0 OSTEOPOROSIS, UNSPECIFIED OSTEOPOROSIS TYPE, UNSPECIFIED PATHOLOGICAL FRACTURE PRESENCE: Primary | ICD-10-CM

## 2020-10-13 PROBLEM — M81.0 OSTEOPOROSIS: Status: ACTIVE | Noted: 2020-10-13

## 2020-10-13 RX ORDER — ZOLEDRONIC ACID 5 MG/100ML
5 INJECTION, SOLUTION INTRAVENOUS ONCE
Status: CANCELLED | OUTPATIENT
Start: 2020-10-15

## 2020-10-15 ENCOUNTER — HOSPITAL ENCOUNTER (OUTPATIENT)
Dept: INFUSION THERAPY | Facility: HOSPITAL | Age: 60
Discharge: HOME | End: 2020-10-15
Admitting: INTERNAL MEDICINE

## 2020-10-15 VITALS
HEART RATE: 86 BPM | DIASTOLIC BLOOD PRESSURE: 69 MMHG | TEMPERATURE: 98 F | SYSTOLIC BLOOD PRESSURE: 110 MMHG | RESPIRATION RATE: 16 BRPM | HEIGHT: 65 IN | WEIGHT: 138.2 LBS | BODY MASS INDEX: 23.03 KG/M2 | OXYGEN SATURATION: 97 %

## 2020-10-15 DIAGNOSIS — M81.0 OSTEOPOROSIS, UNSPECIFIED OSTEOPOROSIS TYPE, UNSPECIFIED PATHOLOGICAL FRACTURE PRESENCE: Primary | ICD-10-CM

## 2020-10-15 PROCEDURE — G0463 HOSPITAL OUTPT CLINIC VISIT: HCPCS

## 2020-10-15 RX ORDER — ZOLEDRONIC ACID 5 MG/100ML
5 INJECTION, SOLUTION INTRAVENOUS ONCE
Status: DISCONTINUED | OUTPATIENT
Start: 2020-10-15 | End: 2020-10-17 | Stop reason: HOSPADM

## 2020-10-15 RX ORDER — ZOLEDRONIC ACID 5 MG/100ML
5 INJECTION, SOLUTION INTRAVENOUS ONCE
Status: CANCELLED | OUTPATIENT
Start: 2021-10-15

## 2020-10-15 NOTE — PROGRESS NOTES
Patient arrives to [facility] with poison ivy rash on forearms. Patient rescheduled for Reclast infusion.

## 2020-10-28 ENCOUNTER — TELEPHONE (OUTPATIENT)
Dept: NEUROSURGERY | Facility: CLINIC | Age: 60
End: 2020-10-28

## 2020-10-29 ENCOUNTER — OFFICE VISIT (OUTPATIENT)
Dept: NEUROSURGERY | Facility: CLINIC | Age: 60
End: 2020-10-29

## 2020-10-29 ENCOUNTER — HOSPITAL ENCOUNTER (OUTPATIENT)
Dept: INFUSION THERAPY | Facility: HOSPITAL | Age: 60
Discharge: HOME | End: 2020-10-29
Admitting: INTERNAL MEDICINE

## 2020-10-29 VITALS
BODY MASS INDEX: 24.16 KG/M2 | RESPIRATION RATE: 16 BRPM | DIASTOLIC BLOOD PRESSURE: 78 MMHG | HEART RATE: 84 BPM | HEIGHT: 65 IN | SYSTOLIC BLOOD PRESSURE: 116 MMHG | TEMPERATURE: 98.4 F | WEIGHT: 145 LBS

## 2020-10-29 VITALS
OXYGEN SATURATION: 99 % | RESPIRATION RATE: 16 BRPM | DIASTOLIC BLOOD PRESSURE: 63 MMHG | SYSTOLIC BLOOD PRESSURE: 118 MMHG | HEART RATE: 91 BPM | TEMPERATURE: 98.1 F

## 2020-10-29 DIAGNOSIS — S32.030G COMPRESSION FRACTURE OF L3 VERTEBRA WITH DELAYED HEALING: Primary | ICD-10-CM

## 2020-10-29 DIAGNOSIS — M81.0 OSTEOPOROSIS, UNSPECIFIED OSTEOPOROSIS TYPE, UNSPECIFIED PATHOLOGICAL FRACTURE PRESENCE: Primary | ICD-10-CM

## 2020-10-29 DIAGNOSIS — M81.0 OSTEOPOROSIS, UNSPECIFIED OSTEOPOROSIS TYPE, UNSPECIFIED PATHOLOGICAL FRACTURE PRESENCE: ICD-10-CM

## 2020-10-29 PROCEDURE — 99214 OFFICE O/P EST MOD 30 MIN: CPT | Performed by: RADIOLOGY

## 2020-10-29 PROCEDURE — 25010000002 ZOLEDRONIC ACID 5 MG/100ML SOLUTION: Performed by: INTERNAL MEDICINE

## 2020-10-29 PROCEDURE — 96365 THER/PROPH/DIAG IV INF INIT: CPT

## 2020-10-29 RX ORDER — ZOLEDRONIC ACID 5 MG/100ML
5 INJECTION, SOLUTION INTRAVENOUS ONCE
Status: COMPLETED | OUTPATIENT
Start: 2020-10-29 | End: 2020-10-29

## 2020-10-29 RX ORDER — CEFAZOLIN SODIUM 2 G/100ML
2 INJECTION, SOLUTION INTRAVENOUS ONCE
Status: CANCELLED | OUTPATIENT
Start: 2020-11-06 | End: 2020-10-29

## 2020-10-29 RX ORDER — ZOLEDRONIC ACID 5 MG/100ML
5 INJECTION, SOLUTION INTRAVENOUS ONCE
OUTPATIENT
Start: 2021-10-15

## 2020-10-29 RX ADMIN — ZOLEDRONIC ACID 5 MG: 5 INJECTION, SOLUTION INTRAVENOUS at 09:05
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
## 2020-11-04 ENCOUNTER — APPOINTMENT (OUTPATIENT)
Dept: PREADMISSION TESTING | Facility: HOSPITAL | Age: 60
End: 2020-11-04

## 2020-11-04 VITALS
HEIGHT: 65 IN | BODY MASS INDEX: 23.97 KG/M2 | RESPIRATION RATE: 16 BRPM | DIASTOLIC BLOOD PRESSURE: 73 MMHG | SYSTOLIC BLOOD PRESSURE: 120 MMHG | HEART RATE: 81 BPM | OXYGEN SATURATION: 99 % | TEMPERATURE: 98.2 F | WEIGHT: 143.9 LBS

## 2020-11-04 LAB
ANION GAP SERPL CALCULATED.3IONS-SCNC: 5.3 MMOL/L (ref 5–15)
BUN SERPL-MCNC: 8 MG/DL (ref 8–23)
BUN/CREAT SERPL: 12.7 (ref 7–25)
CALCIUM SPEC-SCNC: 9.6 MG/DL (ref 8.6–10.5)
CHLORIDE SERPL-SCNC: 102 MMOL/L (ref 98–107)
CO2 SERPL-SCNC: 32.7 MMOL/L (ref 22–29)
CREAT SERPL-MCNC: 0.63 MG/DL (ref 0.57–1)
DEPRECATED RDW RBC AUTO: 39.5 FL (ref 37–54)
ERYTHROCYTE [DISTWIDTH] IN BLOOD BY AUTOMATED COUNT: 12.4 % (ref 12.3–15.4)
GFR SERPL CREATININE-BSD FRML MDRD: 96 ML/MIN/1.73
GLUCOSE SERPL-MCNC: 101 MG/DL (ref 65–99)
HCT VFR BLD AUTO: 36.7 % (ref 34–46.6)
HGB BLD-MCNC: 12.1 G/DL (ref 12–15.9)
MCH RBC QN AUTO: 28.7 PG (ref 26.6–33)
MCHC RBC AUTO-ENTMCNC: 33 G/DL (ref 31.5–35.7)
MCV RBC AUTO: 87.2 FL (ref 79–97)
PLATELET # BLD AUTO: 367 10*3/MM3 (ref 140–450)
PMV BLD AUTO: 8.5 FL (ref 6–12)
POTASSIUM SERPL-SCNC: 4.3 MMOL/L (ref 3.5–5.2)
QT INTERVAL: 370 MS
RBC # BLD AUTO: 4.21 10*6/MM3 (ref 3.77–5.28)
SODIUM SERPL-SCNC: 140 MMOL/L (ref 136–145)
WBC # BLD AUTO: 6.24 10*3/MM3 (ref 3.4–10.8)

## 2020-11-04 PROCEDURE — 80048 BASIC METABOLIC PNL TOTAL CA: CPT | Performed by: RADIOLOGY

## 2020-11-04 PROCEDURE — C9803 HOPD COVID-19 SPEC COLLECT: HCPCS | Performed by: RADIOLOGY

## 2020-11-04 PROCEDURE — 85027 COMPLETE CBC AUTOMATED: CPT | Performed by: RADIOLOGY

## 2020-11-04 PROCEDURE — 36415 COLL VENOUS BLD VENIPUNCTURE: CPT

## 2020-11-04 PROCEDURE — 93010 ELECTROCARDIOGRAM REPORT: CPT | Performed by: INTERNAL MEDICINE

## 2020-11-04 PROCEDURE — U0004 COV-19 TEST NON-CDC HGH THRU: HCPCS | Performed by: RADIOLOGY

## 2020-11-04 PROCEDURE — 93005 ELECTROCARDIOGRAM TRACING: CPT

## 2020-11-04 NOTE — DISCHARGE INSTRUCTIONS
Take the following medications the morning of surgery with a small sip of water:  NONE      ARRIVE TO MAIN OR DESK 11-6-2020 AT 6:00AM     If you are on prescription narcotic pain medication to control your pain you may also take that medication the morning of surgery.    General Instructions:  • Do not eat or drink anything after midnight the night before surgery.  • Infants may have breast milk up to four hours before surgery.  • Infants drinking formula may drink formula up to six hours before surgery.   • Patients who avoid smoking, chewing tobacco and alcohol for 4 weeks prior to surgery have a reduced risk of post-operative complications.  Quit smoking as many days before surgery as you can.  • Do not smoke, use chewing tobacco or drink alcohol the day of surgery.   • If applicable bring your C-PAP/ BI-PAP machine.  • Bring any papers given to you in the doctor’s office.  • Wear clean comfortable clothes.  • Do not wear contact lenses, false eyelashes or make-up.  Bring a case for your glasses.   • Bring crutches or walker if applicable.  • Remove all piercings.  Leave jewelry and any other valuables at home.  • Hair extensions with metal clips must be removed prior to surgery.  • The Pre-Admission Testing nurse will instruct you to bring medications if unable to obtain an accurate list in Pre-Admission Testing.        Preventing a Surgical Site Infection:  • For 2 to 3 days before surgery, avoid shaving with a razor because the razor can irritate skin and make it easier to develop an infection.    • Any areas of open skin can increase the risk of a post-operative wound infection by allowing bacteria to enter and travel throughout the body.  Notify your surgeon if you have any skin wounds / rashes even if it is not near the expected surgical site.  The area will need assessed to determine if surgery should be delayed until it is healed.  • The night prior to surgery shower using a fresh bar of anti-bacterial  soap (such as Dial) and clean washcloth.  Sleep in a clean bed with clean clothing.  Do not allow pets to sleep with you.  • Shower on the morning of surgery using a fresh bar of anti-bacterial soap (such as Dial) and clean washcloth.  Dry with a clean towel and dress in clean clothing.  • Ask your surgeon if you will be receiving antibiotics prior to surgery.  • Make sure you, your family, and all healthcare providers clean their hands with soap and water or an alcohol based hand  before caring for you or your wound.    Day of surgery:  Your arrival time is approximately two hours before your scheduled surgery time.  Upon arrival, a Pre-op nurse and Anesthesiologist will review your health history, obtain vital signs, and answer questions you may have.  The only belongings needed at this time will be your home medications and if applicable your C-PAP/BI-PAP machine.  If you are staying overnight your family can leave the rest of your belongings in the car and bring them to your room later.  A Pre-op nurse will start an IV and you may receive medication in preparation for surgery, including something to help you relax.  While you are in surgery your family should notify the waiting room  if they leave the waiting room area and provide a contact phone number.    Please be aware that surgery does come with discomfort.  We want to make every effort to control your discomfort so please discuss any uncontrolled symptoms with your nurse.   Your doctor will most likely have prescribed pain medications.      If you are going home after surgery you will receive individualized written care instructions before being discharged.  A responsible adult must drive you to and from the hospital on the day of your surgery and stay with you for 24 hours.    If you are staying overnight following surgery, you will be transported to your hospital room following the recovery period.  [facility] has all  private rooms.    If you have any questions please call Pre-Admission Testing at [phone].  Deductibles and co-payments are collected on the day of service. Please be prepared to pay the required co-pay, deductible or deposit on the day of service as defined by your plan.    Patient Education for Self-Quarantine Process    Following your COVID testing, we strongly recommend that you do not leave your home after you have been tested for COVID except to get medical care. This includes not going to work, school or to public areas.  If this is not possible for you to do please limit your activities to only required outings.  Be sure to wear a mask when you are with other people, practice social distancing and wash your hands frequently.      The following items provide additional details to keep you safe.  • Wash your hands with soap and water frequently for at least 20 seconds.   • Avoid touching your eyes, nose and mouth with unwashed hands.  • Do not share anything - utensils, towels, food from the same bowl.   • Have your own utensils, drinking glass, dishes, towels and bedding.   • Do not have visitors.   • Do use FaceTime to stay in touch with family and friends.  • You should stay in a specific room away from others if possible.   • Stay at least 6 feet away from others in the home if you cannot have a dedicated room to yourself.   • Do not snuggle with your pet. While the CDC says there is no evidence that pets can spread COVID-19 or be infected from humans, it is probably best to avoid “petting, snuggling, being kissed or licked and sharing food (during self-quarantine)”, according to the CDC.   • Sanitize household surfaces daily. Include all high touch areas (door handles, light switches, phones, countertops, etc.)  • Do not share a bathroom with others, if possible.   • Wear a mask around others in your home if you are unable to stay in a separate room or 6 feet apart. If  you are unable to wear a mask,  have your family member wear a mask if they must be within 6 feet of you.   Call your surgeon immediately if you experience any of the following symptoms:  • Sore Throat  • Shortness of Breath or difficulty breathing  • Cough  • Chills  • Body soreness or muscle pain  • Headache  • Fever  • New loss of taste or smell  • Do not arrive for your surgery ill.  Your procedure will need to be rescheduled to another time.  You will need to call your physician before the day of surgery to avoid any unnecessary exposure to hospital staff as well as other patients.

## 2020-11-05 LAB — SARS-COV-2 RNA RESP QL NAA+PROBE: NOT DETECTED

## 2020-11-06 ENCOUNTER — HOSPITAL ENCOUNTER (OUTPATIENT)
Facility: HOSPITAL | Age: 60
Setting detail: HOSPITAL OUTPATIENT SURGERY
Discharge: HOME | End: 2020-11-06
Attending: RADIOLOGY | Admitting: RADIOLOGY

## 2020-11-06 ENCOUNTER — ANESTHESIA EVENT (OUTPATIENT)
Dept: PERIOP | Facility: HOSPITAL | Age: 60
End: 2020-11-06

## 2020-11-06 ENCOUNTER — APPOINTMENT (OUTPATIENT)
Dept: GENERAL RADIOLOGY | Facility: HOSPITAL | Age: 60
End: 2020-11-06

## 2020-11-06 ENCOUNTER — ANESTHESIA (OUTPATIENT)
Dept: PERIOP | Facility: HOSPITAL | Age: 60
End: 2020-11-06

## 2020-11-06 VITALS
HEART RATE: 79 BPM | RESPIRATION RATE: 16 BRPM | TEMPERATURE: 98.2 F | SYSTOLIC BLOOD PRESSURE: 131 MMHG | DIASTOLIC BLOOD PRESSURE: 79 MMHG | HEIGHT: 65 IN | WEIGHT: 142.42 LBS | BODY MASS INDEX: 23.73 KG/M2 | OXYGEN SATURATION: 98 %

## 2020-11-06 DIAGNOSIS — S32.030G COMPRESSION FRACTURE OF L3 VERTEBRA WITH DELAYED HEALING: ICD-10-CM

## 2020-11-06 PROCEDURE — 25010000002 DEXAMETHASONE PER 1 MG: Performed by: NURSE ANESTHETIST, CERTIFIED REGISTERED

## 2020-11-06 PROCEDURE — C1713 ANCHOR/SCREW BN/BN,TIS/BN: HCPCS | Performed by: RADIOLOGY

## 2020-11-06 PROCEDURE — 88342 IMHCHEM/IMCYTCHM 1ST ANTB: CPT | Performed by: RADIOLOGY

## 2020-11-06 PROCEDURE — 25010000002 FENTANYL CITRATE (PF) 100 MCG/2ML SOLUTION: Performed by: ANESTHESIOLOGY

## 2020-11-06 PROCEDURE — 25010000002 PROPOFOL 10 MG/ML EMULSION: Performed by: NURSE ANESTHETIST, CERTIFIED REGISTERED

## 2020-11-06 PROCEDURE — 25010000002 ONDANSETRON PER 1 MG: Performed by: NURSE ANESTHETIST, CERTIFIED REGISTERED

## 2020-11-06 PROCEDURE — 88307 TISSUE EXAM BY PATHOLOGIST: CPT | Performed by: RADIOLOGY

## 2020-11-06 PROCEDURE — 25010000003 CEFAZOLIN IN DEXTROSE 2-4 GM/100ML-% SOLUTION: Performed by: RADIOLOGY

## 2020-11-06 PROCEDURE — 25010000002 NEOSTIGMINE PER 0.5 MG: Performed by: NURSE ANESTHETIST, CERTIFIED REGISTERED

## 2020-11-06 PROCEDURE — 88311 DECALCIFY TISSUE: CPT | Performed by: RADIOLOGY

## 2020-11-06 PROCEDURE — 25010000003 LIDOCAINE 1 % SOLUTION: Performed by: RADIOLOGY

## 2020-11-06 PROCEDURE — 22514 PERQ VERTEBRAL AUGMENTATION: CPT | Performed by: RADIOLOGY

## 2020-11-06 DEVICE — CEMENT C01A KYPHX HV-R BONE CEMENT EN
Type: IMPLANTABLE DEVICE | Site: SPINE LUMBAR | Status: FUNCTIONAL
Brand: KYPHON® HV-R® BONE CEMENT

## 2020-11-06 RX ORDER — ACETAMINOPHEN 325 MG/1
650 TABLET ORAL ONCE AS NEEDED
Status: COMPLETED | OUTPATIENT
Start: 2020-11-06 | End: 2020-11-06

## 2020-11-06 RX ORDER — FENTANYL CITRATE 50 UG/ML
50 INJECTION, SOLUTION INTRAMUSCULAR; INTRAVENOUS
Status: DISCONTINUED | OUTPATIENT
Start: 2020-11-06 | End: 2020-11-06 | Stop reason: HOSPADM

## 2020-11-06 RX ORDER — EPHEDRINE SULFATE 50 MG/ML
5 INJECTION, SOLUTION INTRAVENOUS ONCE AS NEEDED
Status: DISCONTINUED | OUTPATIENT
Start: 2020-11-06 | End: 2020-11-06 | Stop reason: HOSPADM

## 2020-11-06 RX ORDER — ONDANSETRON 2 MG/ML
INJECTION INTRAMUSCULAR; INTRAVENOUS AS NEEDED
Status: DISCONTINUED | OUTPATIENT
Start: 2020-11-06 | End: 2020-11-06 | Stop reason: SURG

## 2020-11-06 RX ORDER — LIDOCAINE HYDROCHLORIDE 10 MG/ML
INJECTION, SOLUTION INFILTRATION; PERINEURAL AS NEEDED
Status: DISCONTINUED | OUTPATIENT
Start: 2020-11-06 | End: 2020-11-06 | Stop reason: HOSPADM

## 2020-11-06 RX ORDER — ROCURONIUM BROMIDE 10 MG/ML
INJECTION, SOLUTION INTRAVENOUS AS NEEDED
Status: DISCONTINUED | OUTPATIENT
Start: 2020-11-06 | End: 2020-11-06 | Stop reason: SURG

## 2020-11-06 RX ORDER — DIPHENHYDRAMINE HCL 25 MG
25 CAPSULE ORAL
Status: DISCONTINUED | OUTPATIENT
Start: 2020-11-06 | End: 2020-11-06 | Stop reason: HOSPADM

## 2020-11-06 RX ORDER — FLUMAZENIL 0.1 MG/ML
0.2 INJECTION INTRAVENOUS AS NEEDED
Status: DISCONTINUED | OUTPATIENT
Start: 2020-11-06 | End: 2020-11-06 | Stop reason: HOSPADM

## 2020-11-06 RX ORDER — CEFAZOLIN SODIUM 2 G/100ML
2 INJECTION, SOLUTION INTRAVENOUS ONCE
Status: COMPLETED | OUTPATIENT
Start: 2020-11-06 | End: 2020-11-06

## 2020-11-06 RX ORDER — GLYCOPYRROLATE 0.2 MG/ML
INJECTION INTRAMUSCULAR; INTRAVENOUS AS NEEDED
Status: DISCONTINUED | OUTPATIENT
Start: 2020-11-06 | End: 2020-11-06 | Stop reason: SURG

## 2020-11-06 RX ORDER — ONDANSETRON 2 MG/ML
4 INJECTION INTRAMUSCULAR; INTRAVENOUS ONCE AS NEEDED
Status: DISCONTINUED | OUTPATIENT
Start: 2020-11-06 | End: 2020-11-06 | Stop reason: HOSPADM

## 2020-11-06 RX ORDER — OXYCODONE AND ACETAMINOPHEN 7.5; 325 MG/1; MG/1
1 TABLET ORAL ONCE AS NEEDED
Status: DISCONTINUED | OUTPATIENT
Start: 2020-11-06 | End: 2020-11-06 | Stop reason: HOSPADM

## 2020-11-06 RX ORDER — PROMETHAZINE HYDROCHLORIDE 25 MG/1
25 SUPPOSITORY RECTAL ONCE AS NEEDED
Status: DISCONTINUED | OUTPATIENT
Start: 2020-11-06 | End: 2020-11-06 | Stop reason: HOSPADM

## 2020-11-06 RX ORDER — NALOXONE HCL 0.4 MG/ML
0.2 VIAL (ML) INJECTION AS NEEDED
Status: DISCONTINUED | OUTPATIENT
Start: 2020-11-06 | End: 2020-11-06 | Stop reason: HOSPADM

## 2020-11-06 RX ORDER — LIDOCAINE HYDROCHLORIDE 10 MG/ML
0.5 INJECTION, SOLUTION EPIDURAL; INFILTRATION; INTRACAUDAL; PERINEURAL ONCE AS NEEDED
Status: DISCONTINUED | OUTPATIENT
Start: 2020-11-06 | End: 2020-11-06 | Stop reason: HOSPADM

## 2020-11-06 RX ORDER — LIDOCAINE HYDROCHLORIDE 20 MG/ML
INJECTION, SOLUTION INFILTRATION; PERINEURAL AS NEEDED
Status: DISCONTINUED | OUTPATIENT
Start: 2020-11-06 | End: 2020-11-06 | Stop reason: SURG

## 2020-11-06 RX ORDER — DIPHENHYDRAMINE HYDROCHLORIDE 50 MG/ML
12.5 INJECTION INTRAMUSCULAR; INTRAVENOUS
Status: DISCONTINUED | OUTPATIENT
Start: 2020-11-06 | End: 2020-11-06 | Stop reason: HOSPADM

## 2020-11-06 RX ORDER — SODIUM CHLORIDE, SODIUM LACTATE, POTASSIUM CHLORIDE, CALCIUM CHLORIDE 600; 310; 30; 20 MG/100ML; MG/100ML; MG/100ML; MG/100ML
9 INJECTION, SOLUTION INTRAVENOUS CONTINUOUS
Status: DISCONTINUED | OUTPATIENT
Start: 2020-11-06 | End: 2020-11-06 | Stop reason: HOSPADM

## 2020-11-06 RX ORDER — HYDROMORPHONE HYDROCHLORIDE 1 MG/ML
0.5 INJECTION, SOLUTION INTRAMUSCULAR; INTRAVENOUS; SUBCUTANEOUS
Status: DISCONTINUED | OUTPATIENT
Start: 2020-11-06 | End: 2020-11-06 | Stop reason: HOSPADM

## 2020-11-06 RX ORDER — PROPOFOL 10 MG/ML
VIAL (ML) INTRAVENOUS AS NEEDED
Status: DISCONTINUED | OUTPATIENT
Start: 2020-11-06 | End: 2020-11-06 | Stop reason: SURG

## 2020-11-06 RX ORDER — DEXAMETHASONE SODIUM PHOSPHATE 10 MG/ML
INJECTION INTRAMUSCULAR; INTRAVENOUS AS NEEDED
Status: DISCONTINUED | OUTPATIENT
Start: 2020-11-06 | End: 2020-11-06 | Stop reason: SURG

## 2020-11-06 RX ORDER — SODIUM CHLORIDE 0.9 % (FLUSH) 0.9 %
3 SYRINGE (ML) INJECTION EVERY 12 HOURS SCHEDULED
Status: DISCONTINUED | OUTPATIENT
Start: 2020-11-06 | End: 2020-11-06 | Stop reason: HOSPADM

## 2020-11-06 RX ORDER — LABETALOL HYDROCHLORIDE 5 MG/ML
5 INJECTION, SOLUTION INTRAVENOUS
Status: DISCONTINUED | OUTPATIENT
Start: 2020-11-06 | End: 2020-11-06 | Stop reason: HOSPADM

## 2020-11-06 RX ORDER — ACETAMINOPHEN 500 MG
500 TABLET ORAL EVERY 6 HOURS PRN
COMMUNITY

## 2020-11-06 RX ORDER — SODIUM CHLORIDE 0.9 % (FLUSH) 0.9 %
10 SYRINGE (ML) INJECTION AS NEEDED
Status: DISCONTINUED | OUTPATIENT
Start: 2020-11-06 | End: 2020-11-06 | Stop reason: HOSPADM

## 2020-11-06 RX ORDER — SODIUM CHLORIDE 0.9 % (FLUSH) 0.9 %
3-10 SYRINGE (ML) INJECTION AS NEEDED
Status: DISCONTINUED | OUTPATIENT
Start: 2020-11-06 | End: 2020-11-06 | Stop reason: HOSPADM

## 2020-11-06 RX ORDER — HYDROCODONE BITARTRATE AND ACETAMINOPHEN 7.5; 325 MG/1; MG/1
1 TABLET ORAL ONCE AS NEEDED
Status: DISCONTINUED | OUTPATIENT
Start: 2020-11-06 | End: 2020-11-06 | Stop reason: HOSPADM

## 2020-11-06 RX ORDER — PROMETHAZINE HYDROCHLORIDE 25 MG/1
25 TABLET ORAL ONCE AS NEEDED
Status: DISCONTINUED | OUTPATIENT
Start: 2020-11-06 | End: 2020-11-06 | Stop reason: HOSPADM

## 2020-11-06 RX ADMIN — ONDANSETRON HYDROCHLORIDE 4 MG: 2 SOLUTION INTRAMUSCULAR; INTRAVENOUS at 08:09

## 2020-11-06 RX ADMIN — SODIUM CHLORIDE, POTASSIUM CHLORIDE, SODIUM LACTATE AND CALCIUM CHLORIDE: 600; 310; 30; 20 INJECTION, SOLUTION INTRAVENOUS at 08:05

## 2020-11-06 RX ADMIN — GLYCOPYRROLATE 0.4 MG: 0.2 INJECTION INTRAMUSCULAR; INTRAVENOUS at 08:53

## 2020-11-06 RX ADMIN — ROCURONIUM BROMIDE 25 MG: 10 INJECTION INTRAVENOUS at 08:10

## 2020-11-06 RX ADMIN — ACETAMINOPHEN 650 MG: 325 TABLET, FILM COATED ORAL at 10:14

## 2020-11-06 RX ADMIN — CEFAZOLIN SODIUM 2 G: 2 INJECTION, SOLUTION INTRAVENOUS at 08:18

## 2020-11-06 RX ADMIN — NEOSTIGMINE METHYLSULFATE 3 MG: 1 INJECTION INTRAMUSCULAR; INTRAVENOUS; SUBCUTANEOUS at 08:53

## 2020-11-06 RX ADMIN — GLYCOPYRROLATE 0.2 MG: 0.2 INJECTION INTRAMUSCULAR; INTRAVENOUS at 08:09

## 2020-11-06 RX ADMIN — DEXAMETHASONE SODIUM PHOSPHATE 8 MG: 10 INJECTION INTRAMUSCULAR; INTRAVENOUS at 08:19

## 2020-11-06 RX ADMIN — FENTANYL CITRATE 50 MCG: 50 INJECTION INTRAMUSCULAR; INTRAVENOUS at 08:33

## 2020-11-06 RX ADMIN — LIDOCAINE HYDROCHLORIDE 30 MG: 20 INJECTION, SOLUTION INFILTRATION; PERINEURAL at 08:10

## 2020-11-06 RX ADMIN — PROPOFOL 200 MG: 10 INJECTION, EMULSION INTRAVENOUS at 08:10

## 2020-11-06 NOTE — BRIEF OP NOTE
KYPHOPLASTY 1-2 LEVELS  Progress Note    Poly Molina  11/6/2020    Pre-op Diagnosis:   Compression fracture of L3 vertebra with delayed healing [S32.030G]       Post-Op Diagnosis Codes:     * Compression fracture of L3 vertebra with delayed healing [S32.030G]    Procedure/CPT® Codes:        Procedure(s):  KYPHOPLASTY at L3    Surgeon(s):  Vicente Lorenzo MD    Anesthesia: General    Staff:   Circulator: Thomas Blanc RN  Scrub Person: Thomas Mena; Gary Jaramillo  Orientee: Kourtney Walsh RN  Vascular Radiology Technician: Miya Elena         Estimated Blood Loss: minimal    Urine Voided: * No values recorded between 11/6/2020  8:02 AM and 11/6/2020  8:52 AM *    Specimens:                Specimens     ID Source Type Tests Collected By Collected At Frozen?      A Spine, Lumbar Bone · TISSUE PATHOLOGY EXAM   Vicente Lorenzo MD 11/6/20 0854      Description: Lumbar 3 biopsy                Drains: * No LDAs found *    Findings: L3 VCF with bilateral Kyphoplasty and good cement deposition.    Complications: None encountered          Vicente Lorenzo MD     Date: 11/6/2020  Time: 09:02 EST

## 2020-11-06 NOTE — ANESTHESIA POSTPROCEDURE EVALUATION
"Patient: Poly Molina    Procedure Summary     Date: 11/06/20 Room / Location: [facility]    Anesthesia Start: 0804 Anesthesia Stop: 0915    Procedure: KYPHOPLASTY at L3 with biopsy (Bilateral Spine Lumbar) Diagnosis:       Compression fracture of L3 vertebra with delayed healing      (Compression fracture of L3 vertebra with delayed healing [S32.030G])    Surgeon: Vicente Lorenzo MD Provider: Thomas Bai MD    Anesthesia Type: general ASA Status: 1          Anesthesia Type: general    Vitals  Vitals Value Taken Time   /69 11/06/20 0930   Temp 36.8 °C (98.2 °F) 11/06/20 0911   Pulse 79 11/06/20 0942   Resp 16 11/06/20 0915   SpO2 99 % 11/06/20 0942   Vitals shown include unvalidated device data.        Post Anesthesia Care and Evaluation    Patient location during evaluation: bedside  Patient participation: complete - patient participated  Level of consciousness: awake and alert  Pain management: adequate  Airway patency: patent  Anesthetic complications: No anesthetic complications  PONV Status: none  Cardiovascular status: acceptable  Respiratory status: acceptable  Hydration status: acceptable    Comments: /69   Pulse 81   Temp 36.8 °C (98.2 °F) (Oral)   Resp 16   Ht 165.1 cm (65\")   Wt 64.6 kg (142 lb 6.7 oz)   SpO2 100%   BMI 23.70 kg/m²         "

## 2020-11-06 NOTE — OP NOTE
Preoperative diagnosis: L3 traumatic compression fracture     Postoperative diagnosis: Same as above     Procedures performed: L3 Kyphoplasty and L3 Bone Biopsy     Surgeon: Vicente Lorenzo MD     Anesthesia: General endotracheal     EBL: 10 cc     Complications: None     Specimen sent: L3 bone from right vertebral body     Findings: Subacute fractures     Postoperative condition: Good     Indications for the operation:  The patient is a 60-year-old female who is quite active for her age and had acute back pain during a waterskiing accident with subsequent imaging showing edema in the inferior endplate of L3.   presented on 9- for evaluation of the VCF and tried conservative treatment that was unsuccessful and now returns for discussion of a kyphoplasty.  The risks, alternatives and procedure were explained to the patient who expressed understanding and wished to proceed for relief of her pain. She has tried muscle relaxant and NSAIDs for the symptoms with no significant improvement.  Patient is here now for a kyphoplasty for pain relief and vertebral body stabilization.     Informed consent: She understood that the goal of surgery is stabilization of the fracture to decrease pain and improve the quality of life.  The patient knows that the surgery will not prevent another fracture and that he or she may need additional surgery in the future.  The risks include, but are not limited to, infection, hemorrhage requiring transfusion or reoperation, extravasation of cement causing spinal cord injury, pulmonary embolus, incomplete relief of symptoms, potential need for additional surgeries in the future, stroke, paralysis, coma, and death.  The patient agrees to proceed.     Details of the operation:   After medical clearance, the patient was taken to the operating room and remained in his gurney in the supine position. After induction and endotracheal intubation, she got Clindamycin  as per the SCIP  protocol. SCDs were placed. No Chaves was necessary. Venous access was secured. She was rolled over in the prone position on the radiolucent x-ray table. All pressure points were padded including the brachial plexus. The thoracolumbar region was then prepped and draped in the usual sterile fashion as were the biplanar C-arms. We did a surgical timeout. We aligned the C-arms in orthogonal projections at L3. I identified the pedicle entry sites and then made a stab incision at each site with a #11 skin knife. Using a modified Jamshvenus 10 gauge needle, I cannulated first the right then the left L3 pedicle using transpedicular technique and getting 2 mm in the posterior cortex. I got a bone biopsy from the right side at L3 which was sent to pathology. I put 15 mm balloons into the vertebral body and then inflated the balloons to peak PSI of about 350 PSI. We got good amount of expansion, but not much elevation of the endplate. I deflated the balloons and proceeded to put in 3 cc worth of cement in the right and left sides at L3 with good fill and no significant extravasation. I took the cannulas out and got permanent radiographic records. The needles were removed. The skin was closed with the appropriate sterile, clean, dry dressing.  The patient tolerated the procedure well. She was rolled over in supine position, extubated, and taken to the recovery room in satisfactory condition. No immediate complications encountered.

## 2020-11-06 NOTE — ANESTHESIA PREPROCEDURE EVALUATION
Anesthesia Evaluation     Patient summary reviewed and Nursing notes reviewed   no history of anesthetic complications:  NPO Solid Status: > 8 hours  NPO Liquid Status: > 2 hours           Airway   Mallampati: II  TM distance: >3 FB  Neck ROM: full  no difficulty expected  Dental - normal exam     Pulmonary - negative pulmonary ROS and normal exam   (-) COPD, asthma, not a smoker, lung cancer  Cardiovascular - negative cardio ROS and normal exam  Exercise tolerance: excellent (>7 METS)    ECG reviewed  Rhythm: regular  Rate: normal    (-) hypertension, valvular problems/murmurs, past MI, CAD, dysrhythmias, angina, CHF, cardiac stents, CABG, pericardial effusion      Neuro/Psych- negative ROS  (-) seizures, TIA, CVA  GI/Hepatic/Renal/Endo - negative ROS   (-) hiatal hernia, GERD, PUD, hepatitis, liver disease, no renal disease, diabetes, GI bleed, no thyroid disorder    Musculoskeletal (-) negative ROS    Abdominal  - normal exam   Substance History - negative use     OB/GYN negative ob/gyn ROS         Other - negative ROS                       Anesthesia Plan    ASA 1     general     intravenous induction     Anesthetic plan, all risks, benefits, and alternatives have been provided, discussed and informed consent has been obtained with: patient.    Plan discussed with CRNA and attending.

## 2020-11-06 NOTE — ANESTHESIA PROCEDURE NOTES
Airway  Urgency: elective    Date/Time: 11/6/2020 8:14 AM  Airway not difficult    General Information and Staff    Patient location during procedure: OR  Anesthesiologist: Thomas Bai MD  CRNA: Dian Gomez CRNA    Indications and Patient Condition  Indications for airway management: airway protection    Preoxygenated: yes  Mask difficulty assessment: 1 - vent by mask    Final Airway Details  Final airway type: endotracheal airway      Successful airway: ETT  Cuffed: yes   Successful intubation technique: direct laryngoscopy  Facilitating devices/methods: intubating stylet  Endotracheal tube insertion site: oral  Blade: Spicer  Blade size: 2  ETT size (mm): 7.0  Cormack-Lehane Classification: grade I - full view of glottis  Placement verified by: chest auscultation and capnometry   Cuff volume (mL): 7  Measured from: teeth  ETT/EBT  to teeth (cm): 19  Number of attempts at approach: 1  Assessment: lips, teeth, and gum same as pre-op and atraumatic intubation

## 2020-11-09 ENCOUNTER — TELEPHONE (OUTPATIENT)
Dept: NEUROSURGERY | Facility: CLINIC | Age: 60
End: 2020-11-09

## 2020-11-09 LAB
CYTO UR: NORMAL
LAB AP CASE REPORT: NORMAL
LAB AP SPECIAL STAINS: NORMAL
PATH REPORT.FINAL DX SPEC: NORMAL
PATH REPORT.GROSS SPEC: NORMAL

## 2020-11-12 ENCOUNTER — OFFICE VISIT (OUTPATIENT)
Dept: NEUROSURGERY | Facility: CLINIC | Age: 60
End: 2020-11-12

## 2020-11-12 VITALS
TEMPERATURE: 97.8 F | SYSTOLIC BLOOD PRESSURE: 106 MMHG | HEIGHT: 65 IN | HEART RATE: 80 BPM | DIASTOLIC BLOOD PRESSURE: 70 MMHG | BODY MASS INDEX: 23.7 KG/M2 | RESPIRATION RATE: 16 BRPM

## 2020-11-12 DIAGNOSIS — S32.030G COMPRESSION FRACTURE OF L3 VERTEBRA WITH DELAYED HEALING: Primary | ICD-10-CM

## 2020-11-12 DIAGNOSIS — M81.0 OSTEOPOROSIS, UNSPECIFIED OSTEOPOROSIS TYPE, UNSPECIFIED PATHOLOGICAL FRACTURE PRESENCE: ICD-10-CM

## 2020-11-12 PROCEDURE — 99024 POSTOP FOLLOW-UP VISIT: CPT | Performed by: RADIOLOGY

## 2021-03-19 ENCOUNTER — BULK ORDERING (OUTPATIENT)
Dept: CASE MANAGEMENT | Facility: OTHER | Age: 61
End: 2021-03-19

## 2021-03-19 DIAGNOSIS — Z23 IMMUNIZATION DUE: ICD-10-CM

## 2021-03-22 ENCOUNTER — IMMUNIZATION (OUTPATIENT)
Dept: VACCINE CLINIC | Facility: HOSPITAL | Age: 61
End: 2021-03-22

## 2021-03-22 DIAGNOSIS — Z23 IMMUNIZATION DUE: ICD-10-CM

## 2021-03-22 PROCEDURE — 0001A: CPT | Performed by: INTERNAL MEDICINE

## 2021-03-22 PROCEDURE — 91300 HC SARSCOV02 VAC 30MCG/0.3ML IM: CPT | Performed by: INTERNAL MEDICINE

## 2021-04-12 ENCOUNTER — IMMUNIZATION (OUTPATIENT)
Dept: VACCINE CLINIC | Facility: HOSPITAL | Age: 61
End: 2021-04-12

## 2021-04-12 PROCEDURE — 91300 HC SARSCOV02 VAC 30MCG/0.3ML IM: CPT | Performed by: INTERNAL MEDICINE

## 2021-04-12 PROCEDURE — 0002A: CPT | Performed by: INTERNAL MEDICINE

## 2021-07-06 ENCOUNTER — OFFICE VISIT (OUTPATIENT)
Dept: OBSTETRICS AND GYNECOLOGY | Age: 61
End: 2021-07-06

## 2021-07-06 VITALS
WEIGHT: 147.2 LBS | SYSTOLIC BLOOD PRESSURE: 138 MMHG | HEIGHT: 65 IN | BODY MASS INDEX: 24.53 KG/M2 | DIASTOLIC BLOOD PRESSURE: 84 MMHG

## 2021-07-06 DIAGNOSIS — R10.2 PELVIC PRESSURE IN FEMALE: ICD-10-CM

## 2021-07-06 DIAGNOSIS — M81.0 OSTEOPOROSIS, UNSPECIFIED OSTEOPOROSIS TYPE, UNSPECIFIED PATHOLOGICAL FRACTURE PRESENCE: ICD-10-CM

## 2021-07-06 DIAGNOSIS — Z12.4 SCREENING FOR CERVICAL CANCER: ICD-10-CM

## 2021-07-06 DIAGNOSIS — R35.0 URINARY FREQUENCY: Primary | ICD-10-CM

## 2021-07-06 DIAGNOSIS — Z00.00 ENCOUNTER FOR ANNUAL PHYSICAL EXAM: ICD-10-CM

## 2021-07-06 LAB
BILIRUB BLD-MCNC: NEGATIVE MG/DL
CLARITY, POC: CLEAR
COLOR UR: YELLOW
GLUCOSE UR STRIP-MCNC: NEGATIVE MG/DL
KETONES UR QL: NEGATIVE
LEUKOCYTE EST, POC: NEGATIVE
NITRITE UR-MCNC: NEGATIVE MG/ML
PH UR: 6.5 [PH] (ref 5–8)
PROT UR STRIP-MCNC: NEGATIVE MG/DL
RBC # UR STRIP: ABNORMAL /UL
SP GR UR: 1.01 (ref 1–1.03)
UROBILINOGEN UR QL: NORMAL

## 2021-07-06 PROCEDURE — 99396 PREV VISIT EST AGE 40-64: CPT | Performed by: OBSTETRICS & GYNECOLOGY

## 2021-07-06 PROCEDURE — 81002 URINALYSIS NONAUTO W/O SCOPE: CPT | Performed by: OBSTETRICS & GYNECOLOGY

## 2021-07-06 NOTE — PROGRESS NOTES
Subjective   Chief Complaint   Patient presents with   • Gynecologic Exam     AE today, Last AE 2020 w/pap, Mg 2020, DEXA 2020, c/o urinary frequency      History of Present Illness    Poly Molina is a very pleasant  61 y.o. female who presents for annual exam.  , Mammo Exam scheduled today, , Exercise yes  Patient is in for annual exam she is postmenopausal without gynecological concerns or complaints.  She went waterskiing and fractured her lumbar vertebra, she is on yearly injections with her PCP she still otherwise healthy, her 2 children are doing well.  She is known to have dense breast..    Obstetric History:  OB History        2    Para   2    Term   2            AB        Living           SAB        TAB        Ectopic        Molar        Multiple        Live Births                   Menstrual History:     No LMP recorded. Patient is postmenopausal.       Sexual History:       Past Medical History:   Diagnosis Date   • Compression fracture of L3 vertebra with delayed healing     CAUSED BY WATER SKIING   • Fibrocystic breast    • Increased prolactin level    • Osteoporosis      Past Surgical History:   Procedure Laterality Date   • BACK SURGERY  10/2020    L3 repair   • COLONOSCOPY N/A 2016    Procedure: COLONOSCOPY INTO CECUM;  Surgeon: Savanna Roberts MD;  Location: [facility] ENDOSCOPY;  Service:    • FOREARM SURGERY Right     BROKEN WRIST   • KNEE ACL RECONSTRUCTION Right    • KYPHOPLASTY Bilateral 2020    Procedure: KYPHOPLASTY at L3 with biopsy;  Surgeon: Vicente Lorenzo MD;  Location: [facility] HYBRID OR ;  Service: Neurosurgery;  Laterality: Bilateral;       Current Outpatient Medications:   •  acetaminophen (TYLENOL) 500 MG tablet, Take 500 mg by mouth Every 6 (Six) Hours As Needed for Mild Pain ., Disp: , Rfl:   •  Cholecalciferol (VITAMIN D3) 1.25 MG (33755 UT) capsule, Take 50,000 Units by mouth Every 7 (Seven) Days., Disp: , Rfl:   •   "cyclobenzaprine (FLEXERIL) 10 MG tablet, Take 10 mg by mouth As Needed., Disp: , Rfl:   •  Vitamin B12 (CYANOCOBALAMIN) 500 MCG tablet tablet, Take 500 mcg by mouth Daily. HOLD FOR SURGERY, Disp: , Rfl:    SOCIAL Hx:      The following portions of the patient's history were reviewed and updated as appropriate: allergies, current medications, past family history, past medical history, past social history, past surgical history and problem list.    Review of Systems      Urinary incontinence assessment discussed      Except as outlined in history of physical illness, patient denies any changes in her GYN, , GI systems.  All other systems reviewed are negative         Objective   Physical Exam    /84   Ht 165.1 cm (65\")   Wt 66.8 kg (147 lb 3.2 oz)   Breastfeeding No   BMI 24.50 kg/m²     General: Patient is alert and oriented and appears overall healthy  Neck: Is supple without thyromegaly, no carotid bruits and no lymphadenopathy  Lungs: Clear bilaterally, no wheezing, rhonchi, or rales.  Respiratory rate is normal  Breast: Even symmetrical, no lymphadenopathy, no retraction, no discharge ,no masses appreciated on either side  Heart: Regular rate and rhythm are appreciated, no murmurs or rubs are heard  Abdomen: Is soft, without organomegaly, bowel sounds are positive, there is no                                rebound or guarding and palpation does not produce any discomfort  Back: Nontender without CVA tenderness  Pelvic: External genitalia appear normal and consistent with mature female.  BUS normal              Urethra appears normal and without mass, bladder is nontender and without any lesions                        Urethral meatus is normal without scarring tenderness or masses                 Bladder is without tenderness or fullness                           Vagina is clean dry without discharge and appears adequately estrogenized, no               lesions or masses are present                    "      Cervix is noninflamed without discharge or lesions.  There is no cervical motion             tenderness.                Uterus is nonenlarged, without tenderness, and no masses or abnormalities are  present               Adnexa are non-enlarged, non tender               Rectal digital  exam reveals adequate sphincter tone and no masses or lesions are                     appreciated on digital rectal examination.      Annual Well Woman Exam     Patient Active Problem List   Diagnosis   • Osteoporosis   • Compression fracture of L3 vertebra with delayed healing                Assessment/Plan   Diagnoses and all orders for this visit:    1. Urinary frequency (Primary)  -     POC Urinalysis Dipstick    2. Pelvic pressure in female  -     POC Urinalysis Dipstick    3. Screening for cervical cancer  -     IGP, Apt HPV,rfx 16 / 18,45    4. Osteoporosis, unspecified osteoporosis type, unspecified pathological fracture presence    5. Encounter for annual physical exam      Discussed today's findings and concerns with patient.  Continue to recommend regular exercise including cardiovascular and resistance training as well as  breast self-exam. Wellness lab, mammography, & pap smear, in accordance with age guidelines.    I have encouraged her to call for today's test results if she has not received them within 10 days.  Patient is advised to call with any change in her condition or with any other questions, otherwise return  for annual examination.

## 2021-07-09 LAB
CYTOLOGIST CVX/VAG CYTO: NORMAL
CYTOLOGY CVX/VAG DOC CYTO: NORMAL
CYTOLOGY CVX/VAG DOC THIN PREP: NORMAL
DX ICD CODE: NORMAL
HIV 1 & 2 AB SER-IMP: NORMAL
HPV I/H RISK 4 DNA CVX QL PROBE+SIG AMP: NEGATIVE
Lab: NORMAL
OTHER STN SPEC: NORMAL
STAT OF ADQ CVX/VAG CYTO-IMP: NORMAL

## 2021-08-12 ENCOUNTER — TRANSCRIBE ORDERS (OUTPATIENT)
Dept: LAB | Facility: HOSPITAL | Age: 61
End: 2021-08-12

## 2021-08-12 ENCOUNTER — LAB (OUTPATIENT)
Dept: LAB | Facility: HOSPITAL | Age: 61
End: 2021-08-12

## 2021-08-12 DIAGNOSIS — Z00.00 ROUTINE GENERAL MEDICAL EXAMINATION AT A HEALTH CARE FACILITY: ICD-10-CM

## 2021-08-12 DIAGNOSIS — E22.1 HYPERPROLACTINEMIA (HCC): ICD-10-CM

## 2021-08-12 DIAGNOSIS — E55.9 VITAMIN D DEFICIENCY: ICD-10-CM

## 2021-08-12 DIAGNOSIS — Z00.00 ROUTINE GENERAL MEDICAL EXAMINATION AT A HEALTH CARE FACILITY: Primary | ICD-10-CM

## 2021-08-12 LAB
25(OH)D3 SERPL-MCNC: 41.3 NG/ML (ref 30–100)
ALBUMIN SERPL-MCNC: 4.7 G/DL (ref 3.5–5.2)
ALBUMIN/GLOB SERPL: 1.9 G/DL
ALP SERPL-CCNC: 65 U/L (ref 39–117)
ALT SERPL W P-5'-P-CCNC: 11 U/L (ref 1–33)
ANION GAP SERPL CALCULATED.3IONS-SCNC: 9.6 MMOL/L (ref 5–15)
AST SERPL-CCNC: 15 U/L (ref 1–32)
BASOPHILS # BLD AUTO: 0.06 10*3/MM3 (ref 0–0.2)
BASOPHILS NFR BLD AUTO: 1.1 % (ref 0–1.5)
BILIRUB SERPL-MCNC: 0.4 MG/DL (ref 0–1.2)
BILIRUB UR QL STRIP: NEGATIVE
BUN SERPL-MCNC: 7 MG/DL (ref 8–23)
BUN/CREAT SERPL: 9 (ref 7–25)
CALCIUM SPEC-SCNC: 9.6 MG/DL (ref 8.6–10.5)
CHLORIDE SERPL-SCNC: 104 MMOL/L (ref 98–107)
CHOLEST SERPL-MCNC: 189 MG/DL (ref 0–200)
CLARITY UR: CLEAR
CO2 SERPL-SCNC: 28.4 MMOL/L (ref 22–29)
COLOR UR: YELLOW
CREAT SERPL-MCNC: 0.78 MG/DL (ref 0.57–1)
DEPRECATED RDW RBC AUTO: 39.4 FL (ref 37–54)
EOSINOPHIL # BLD AUTO: 0.07 10*3/MM3 (ref 0–0.4)
EOSINOPHIL NFR BLD AUTO: 1.3 % (ref 0.3–6.2)
ERYTHROCYTE [DISTWIDTH] IN BLOOD BY AUTOMATED COUNT: 12.5 % (ref 12.3–15.4)
GFR SERPL CREATININE-BSD FRML MDRD: 75 ML/MIN/1.73
GLOBULIN UR ELPH-MCNC: 2.5 GM/DL
GLUCOSE SERPL-MCNC: 84 MG/DL (ref 65–99)
GLUCOSE UR STRIP-MCNC: NEGATIVE MG/DL
HCT VFR BLD AUTO: 39.4 % (ref 34–46.6)
HDLC SERPL-MCNC: 65 MG/DL (ref 40–60)
HGB BLD-MCNC: 12.7 G/DL (ref 12–15.9)
HGB UR QL STRIP.AUTO: NEGATIVE
IMM GRANULOCYTES # BLD AUTO: 0.01 10*3/MM3 (ref 0–0.05)
IMM GRANULOCYTES NFR BLD AUTO: 0.2 % (ref 0–0.5)
KETONES UR QL STRIP: NEGATIVE
LDLC SERPL CALC-MCNC: 108 MG/DL (ref 0–100)
LDLC/HDLC SERPL: 1.64 {RATIO}
LEUKOCYTE ESTERASE UR QL STRIP.AUTO: NEGATIVE
LYMPHOCYTES # BLD AUTO: 1.59 10*3/MM3 (ref 0.7–3.1)
LYMPHOCYTES NFR BLD AUTO: 28.7 % (ref 19.6–45.3)
MCH RBC QN AUTO: 28.1 PG (ref 26.6–33)
MCHC RBC AUTO-ENTMCNC: 32.2 G/DL (ref 31.5–35.7)
MCV RBC AUTO: 87.2 FL (ref 79–97)
MONOCYTES # BLD AUTO: 0.49 10*3/MM3 (ref 0.1–0.9)
MONOCYTES NFR BLD AUTO: 8.8 % (ref 5–12)
NEUTROPHILS NFR BLD AUTO: 3.32 10*3/MM3 (ref 1.7–7)
NEUTROPHILS NFR BLD AUTO: 59.9 % (ref 42.7–76)
NITRITE UR QL STRIP: NEGATIVE
NRBC BLD AUTO-RTO: 0 /100 WBC (ref 0–0.2)
PH UR STRIP.AUTO: 8 [PH] (ref 5–8)
PLATELET # BLD AUTO: 334 10*3/MM3 (ref 140–450)
PMV BLD AUTO: 8.7 FL (ref 6–12)
POTASSIUM SERPL-SCNC: 4.1 MMOL/L (ref 3.5–5.2)
PROLACTIN SERPL-MCNC: 16 NG/ML (ref 4.79–23.3)
PROT SERPL-MCNC: 7.2 G/DL (ref 6–8.5)
PROT UR QL STRIP: NEGATIVE
RBC # BLD AUTO: 4.52 10*6/MM3 (ref 3.77–5.28)
SODIUM SERPL-SCNC: 142 MMOL/L (ref 136–145)
SP GR UR STRIP: 1.01 (ref 1–1.03)
TRIGL SERPL-MCNC: 86 MG/DL (ref 0–150)
UROBILINOGEN UR QL STRIP: NORMAL
VLDLC SERPL-MCNC: 16 MG/DL (ref 5–40)
WBC # BLD AUTO: 5.54 10*3/MM3 (ref 3.4–10.8)

## 2021-08-12 PROCEDURE — 81003 URINALYSIS AUTO W/O SCOPE: CPT

## 2021-08-12 PROCEDURE — 80061 LIPID PANEL: CPT

## 2021-08-12 PROCEDURE — 85025 COMPLETE CBC W/AUTO DIFF WBC: CPT

## 2021-08-12 PROCEDURE — 80053 COMPREHEN METABOLIC PANEL: CPT

## 2021-08-12 PROCEDURE — 82306 VITAMIN D 25 HYDROXY: CPT

## 2021-08-12 PROCEDURE — 84146 ASSAY OF PROLACTIN: CPT

## 2021-08-12 PROCEDURE — 36415 COLL VENOUS BLD VENIPUNCTURE: CPT

## 2021-08-18 ENCOUNTER — TRANSCRIBE ORDERS (OUTPATIENT)
Dept: ADMINISTRATIVE | Facility: HOSPITAL | Age: 61
End: 2021-08-18

## 2021-08-18 DIAGNOSIS — Z12.31 ENCOUNTER FOR SCREENING MAMMOGRAM FOR BREAST CANCER: Primary | ICD-10-CM

## 2021-08-19 ENCOUNTER — HOSPITAL ENCOUNTER (OUTPATIENT)
Dept: MAMMOGRAPHY | Facility: HOSPITAL | Age: 61
Discharge: HOME | End: 2021-08-19
Admitting: INTERNAL MEDICINE

## 2021-08-19 DIAGNOSIS — Z12.31 ENCOUNTER FOR SCREENING MAMMOGRAM FOR BREAST CANCER: ICD-10-CM

## 2021-08-19 PROCEDURE — 77063 BREAST TOMOSYNTHESIS BI: CPT

## 2021-08-19 PROCEDURE — 77067 SCR MAMMO BI INCL CAD: CPT

## 2021-11-12 ENCOUNTER — IMMUNIZATION (OUTPATIENT)
Dept: VACCINE CLINIC | Facility: HOSPITAL | Age: 61
End: 2021-11-12

## 2021-11-12 PROCEDURE — 0004A ADM SARSCOV2 30MCG/0.3ML BOOSTER: CPT | Performed by: INTERNAL MEDICINE

## 2021-11-12 PROCEDURE — 91300 HC SARSCOV02 VAC 30MCG/0.3ML IM: CPT | Performed by: INTERNAL MEDICINE

## 2022-08-16 ENCOUNTER — LAB (OUTPATIENT)
Dept: LAB | Facility: HOSPITAL | Age: 62
End: 2022-08-16

## 2022-08-16 ENCOUNTER — TRANSCRIBE ORDERS (OUTPATIENT)
Dept: ADMINISTRATIVE | Facility: HOSPITAL | Age: 62
End: 2022-08-16

## 2022-08-16 DIAGNOSIS — E55.9 VITAMIN D DEFICIENCY: ICD-10-CM

## 2022-08-16 DIAGNOSIS — E78.5 HYPERLIPIDEMIA, UNSPECIFIED HYPERLIPIDEMIA TYPE: ICD-10-CM

## 2022-08-16 DIAGNOSIS — Z00.00 ROUTINE GENERAL MEDICAL EXAMINATION AT A HEALTH CARE FACILITY: Primary | ICD-10-CM

## 2022-08-16 DIAGNOSIS — E22.1 HYPERPROLACTINEMIA: ICD-10-CM

## 2022-08-16 DIAGNOSIS — Z00.00 ROUTINE GENERAL MEDICAL EXAMINATION AT A HEALTH CARE FACILITY: ICD-10-CM

## 2022-08-16 LAB
25(OH)D3 SERPL-MCNC: 37.2 NG/ML (ref 30–100)
ALBUMIN SERPL-MCNC: 4.3 G/DL (ref 3.5–5.2)
ALBUMIN/GLOB SERPL: 2 G/DL
ALP SERPL-CCNC: 59 U/L (ref 39–117)
ALT SERPL W P-5'-P-CCNC: 9 U/L (ref 1–33)
ANION GAP SERPL CALCULATED.3IONS-SCNC: 6.6 MMOL/L (ref 5–15)
AST SERPL-CCNC: 14 U/L (ref 1–32)
BASOPHILS # BLD AUTO: 0.04 10*3/MM3 (ref 0–0.2)
BASOPHILS NFR BLD AUTO: 0.7 % (ref 0–1.5)
BILIRUB SERPL-MCNC: 0.5 MG/DL (ref 0–1.2)
BILIRUB UR QL STRIP: NEGATIVE
BUN SERPL-MCNC: 9 MG/DL (ref 8–23)
BUN/CREAT SERPL: 11.7 (ref 7–25)
CALCIUM SPEC-SCNC: 9.3 MG/DL (ref 8.6–10.5)
CHLORIDE SERPL-SCNC: 102 MMOL/L (ref 98–107)
CHOLEST SERPL-MCNC: 192 MG/DL (ref 0–200)
CLARITY UR: CLEAR
CO2 SERPL-SCNC: 30.4 MMOL/L (ref 22–29)
COLOR UR: YELLOW
CREAT SERPL-MCNC: 0.77 MG/DL (ref 0.57–1)
DEPRECATED RDW RBC AUTO: 37 FL (ref 37–54)
EGFRCR SERPLBLD CKD-EPI 2021: 86.8 ML/MIN/1.73
EOSINOPHIL # BLD AUTO: 0.12 10*3/MM3 (ref 0–0.4)
EOSINOPHIL NFR BLD AUTO: 2.1 % (ref 0.3–6.2)
ERYTHROCYTE [DISTWIDTH] IN BLOOD BY AUTOMATED COUNT: 12.4 % (ref 12.3–15.4)
GLOBULIN UR ELPH-MCNC: 2.1 GM/DL
GLUCOSE SERPL-MCNC: 98 MG/DL (ref 65–99)
GLUCOSE UR STRIP-MCNC: NEGATIVE MG/DL
HCT VFR BLD AUTO: 37 % (ref 34–46.6)
HDLC SERPL-MCNC: 59 MG/DL (ref 40–60)
HGB BLD-MCNC: 12.5 G/DL (ref 12–15.9)
HGB UR QL STRIP.AUTO: NEGATIVE
IMM GRANULOCYTES # BLD AUTO: 0.02 10*3/MM3 (ref 0–0.05)
IMM GRANULOCYTES NFR BLD AUTO: 0.3 % (ref 0–0.5)
KETONES UR QL STRIP: NEGATIVE
LDLC SERPL CALC-MCNC: 113 MG/DL (ref 0–100)
LDLC/HDLC SERPL: 1.88 {RATIO}
LEUKOCYTE ESTERASE UR QL STRIP.AUTO: NEGATIVE
LYMPHOCYTES # BLD AUTO: 1.55 10*3/MM3 (ref 0.7–3.1)
LYMPHOCYTES NFR BLD AUTO: 26.5 % (ref 19.6–45.3)
MCH RBC QN AUTO: 28.5 PG (ref 26.6–33)
MCHC RBC AUTO-ENTMCNC: 33.8 G/DL (ref 31.5–35.7)
MCV RBC AUTO: 84.5 FL (ref 79–97)
MONOCYTES # BLD AUTO: 0.44 10*3/MM3 (ref 0.1–0.9)
MONOCYTES NFR BLD AUTO: 7.5 % (ref 5–12)
NEUTROPHILS NFR BLD AUTO: 3.67 10*3/MM3 (ref 1.7–7)
NEUTROPHILS NFR BLD AUTO: 62.9 % (ref 42.7–76)
NITRITE UR QL STRIP: NEGATIVE
NRBC BLD AUTO-RTO: 0 /100 WBC (ref 0–0.2)
PH UR STRIP.AUTO: 7.5 [PH] (ref 5–8)
PLATELET # BLD AUTO: 357 10*3/MM3 (ref 140–450)
PMV BLD AUTO: 8.9 FL (ref 6–12)
POTASSIUM SERPL-SCNC: 4.2 MMOL/L (ref 3.5–5.2)
PROLACTIN SERPL-MCNC: 15 NG/ML (ref 4.79–23.3)
PROT SERPL-MCNC: 6.4 G/DL (ref 6–8.5)
PROT UR QL STRIP: NEGATIVE
RBC # BLD AUTO: 4.38 10*6/MM3 (ref 3.77–5.28)
SODIUM SERPL-SCNC: 139 MMOL/L (ref 136–145)
SP GR UR STRIP: 1.01 (ref 1–1.03)
TRIGL SERPL-MCNC: 110 MG/DL (ref 0–150)
UROBILINOGEN UR QL STRIP: NORMAL
VLDLC SERPL-MCNC: 20 MG/DL (ref 5–40)
WBC NRBC COR # BLD: 5.84 10*3/MM3 (ref 3.4–10.8)

## 2022-08-16 PROCEDURE — 81003 URINALYSIS AUTO W/O SCOPE: CPT

## 2022-08-16 PROCEDURE — 82306 VITAMIN D 25 HYDROXY: CPT

## 2022-08-16 PROCEDURE — 80061 LIPID PANEL: CPT

## 2022-08-16 PROCEDURE — 84146 ASSAY OF PROLACTIN: CPT

## 2022-08-16 PROCEDURE — 80053 COMPREHEN METABOLIC PANEL: CPT

## 2022-08-16 PROCEDURE — 36415 COLL VENOUS BLD VENIPUNCTURE: CPT

## 2022-08-16 PROCEDURE — 85025 COMPLETE CBC W/AUTO DIFF WBC: CPT

## 2022-08-26 ENCOUNTER — TRANSCRIBE ORDERS (OUTPATIENT)
Dept: ADMINISTRATIVE | Facility: HOSPITAL | Age: 62
End: 2022-08-26

## 2022-08-26 DIAGNOSIS — Z30.09 SCREENING AND EVALUATION FOR FEMALE STERILIZATION: Primary | ICD-10-CM

## 2022-08-26 DIAGNOSIS — Z92.89 HISTORY OF MAMMOGRAPHY, SCREENING: ICD-10-CM

## 2022-08-26 DIAGNOSIS — Z78.0 POST-MENOPAUSAL: ICD-10-CM

## 2022-12-16 ENCOUNTER — HOSPITAL ENCOUNTER (OUTPATIENT)
Dept: MAMMOGRAPHY | Facility: HOSPITAL | Age: 62
Discharge: HOME | End: 2022-12-16

## 2022-12-16 ENCOUNTER — HOSPITAL ENCOUNTER (OUTPATIENT)
Dept: BONE DENSITY | Facility: HOSPITAL | Age: 62
Discharge: HOME | End: 2022-12-16

## 2022-12-16 DIAGNOSIS — Z92.89 HISTORY OF MAMMOGRAPHY, SCREENING: ICD-10-CM

## 2022-12-16 DIAGNOSIS — Z78.0 POST-MENOPAUSAL: ICD-10-CM

## 2022-12-16 PROCEDURE — 77067 SCR MAMMO BI INCL CAD: CPT

## 2022-12-16 PROCEDURE — 77063 BREAST TOMOSYNTHESIS BI: CPT

## 2022-12-16 PROCEDURE — 77080 DXA BONE DENSITY AXIAL: CPT

## 2023-08-18 ENCOUNTER — LAB (OUTPATIENT)
Dept: LAB | Facility: HOSPITAL | Age: 63
End: 2023-08-18
Payer: COMMERCIAL

## 2023-08-18 ENCOUNTER — TRANSCRIBE ORDERS (OUTPATIENT)
Dept: ADMINISTRATIVE | Facility: HOSPITAL | Age: 63
End: 2023-08-18
Payer: COMMERCIAL

## 2023-08-18 DIAGNOSIS — Z00.00 ROUTINE GENERAL MEDICAL EXAMINATION AT A HEALTH CARE FACILITY: Primary | ICD-10-CM

## 2023-08-18 DIAGNOSIS — D35.2 PROLACTINOMA: ICD-10-CM

## 2023-08-18 DIAGNOSIS — Z00.00 ROUTINE GENERAL MEDICAL EXAMINATION AT A HEALTH CARE FACILITY: ICD-10-CM

## 2023-08-18 DIAGNOSIS — E55.9 VITAMIN D DEFICIENCY: ICD-10-CM

## 2023-08-18 LAB
25(OH)D3 SERPL-MCNC: 26.6 NG/ML (ref 30–100)
ALBUMIN SERPL-MCNC: 4.3 G/DL (ref 3.5–5.2)
ALBUMIN/GLOB SERPL: 1.8 G/DL
ALP SERPL-CCNC: 66 U/L (ref 39–117)
ALT SERPL W P-5'-P-CCNC: 9 U/L (ref 1–33)
ANION GAP SERPL CALCULATED.3IONS-SCNC: 8.5 MMOL/L (ref 5–15)
AST SERPL-CCNC: 13 U/L (ref 1–32)
BASOPHILS # BLD AUTO: 0.03 10*3/MM3 (ref 0–0.2)
BASOPHILS NFR BLD AUTO: 0.5 % (ref 0–1.5)
BILIRUB SERPL-MCNC: 0.3 MG/DL (ref 0–1.2)
BILIRUB UR QL STRIP: NEGATIVE
BUN SERPL-MCNC: 11 MG/DL (ref 8–23)
BUN/CREAT SERPL: 14.7 (ref 7–25)
CALCIUM SPEC-SCNC: 9.1 MG/DL (ref 8.6–10.5)
CHLORIDE SERPL-SCNC: 105 MMOL/L (ref 98–107)
CHOLEST SERPL-MCNC: 185 MG/DL (ref 0–200)
CLARITY UR: CLEAR
CO2 SERPL-SCNC: 27.5 MMOL/L (ref 22–29)
COLOR UR: YELLOW
CREAT SERPL-MCNC: 0.75 MG/DL (ref 0.57–1)
DEPRECATED RDW RBC AUTO: 39.9 FL (ref 37–54)
EGFRCR SERPLBLD CKD-EPI 2021: 89 ML/MIN/1.73
EOSINOPHIL # BLD AUTO: 0.1 10*3/MM3 (ref 0–0.4)
EOSINOPHIL NFR BLD AUTO: 1.6 % (ref 0.3–6.2)
ERYTHROCYTE [DISTWIDTH] IN BLOOD BY AUTOMATED COUNT: 12.6 % (ref 12.3–15.4)
GLOBULIN UR ELPH-MCNC: 2.4 GM/DL
GLUCOSE SERPL-MCNC: 96 MG/DL (ref 65–99)
GLUCOSE UR STRIP-MCNC: NEGATIVE MG/DL
HCT VFR BLD AUTO: 38.5 % (ref 34–46.6)
HDLC SERPL-MCNC: 53 MG/DL (ref 40–60)
HGB BLD-MCNC: 12.8 G/DL (ref 12–15.9)
HGB UR QL STRIP.AUTO: NEGATIVE
IMM GRANULOCYTES # BLD AUTO: 0.02 10*3/MM3 (ref 0–0.05)
IMM GRANULOCYTES NFR BLD AUTO: 0.3 % (ref 0–0.5)
KETONES UR QL STRIP: NEGATIVE
LDLC SERPL CALC-MCNC: 119 MG/DL (ref 0–100)
LDLC/HDLC SERPL: 2.23 {RATIO}
LEUKOCYTE ESTERASE UR QL STRIP.AUTO: NEGATIVE
LYMPHOCYTES # BLD AUTO: 1.97 10*3/MM3 (ref 0.7–3.1)
LYMPHOCYTES NFR BLD AUTO: 31.8 % (ref 19.6–45.3)
MCH RBC QN AUTO: 28.8 PG (ref 26.6–33)
MCHC RBC AUTO-ENTMCNC: 33.2 G/DL (ref 31.5–35.7)
MCV RBC AUTO: 86.7 FL (ref 79–97)
MONOCYTES # BLD AUTO: 0.58 10*3/MM3 (ref 0.1–0.9)
MONOCYTES NFR BLD AUTO: 9.4 % (ref 5–12)
NEUTROPHILS NFR BLD AUTO: 3.5 10*3/MM3 (ref 1.7–7)
NEUTROPHILS NFR BLD AUTO: 56.4 % (ref 42.7–76)
NITRITE UR QL STRIP: NEGATIVE
NRBC BLD AUTO-RTO: 0 /100 WBC (ref 0–0.2)
PH UR STRIP.AUTO: 7.5 [PH] (ref 5–8)
PLATELET # BLD AUTO: 336 10*3/MM3 (ref 140–450)
PMV BLD AUTO: 9.5 FL (ref 6–12)
POTASSIUM SERPL-SCNC: 4.2 MMOL/L (ref 3.5–5.2)
PROLACTIN SERPL-MCNC: 15.2 NG/ML (ref 4.79–23.3)
PROT SERPL-MCNC: 6.7 G/DL (ref 6–8.5)
PROT UR QL STRIP: ABNORMAL
RBC # BLD AUTO: 4.44 10*6/MM3 (ref 3.77–5.28)
SODIUM SERPL-SCNC: 141 MMOL/L (ref 136–145)
SP GR UR STRIP: 1.02 (ref 1–1.03)
TRIGL SERPL-MCNC: 68 MG/DL (ref 0–150)
UROBILINOGEN UR QL STRIP: ABNORMAL
VLDLC SERPL-MCNC: 13 MG/DL (ref 5–40)
WBC NRBC COR # BLD: 6.2 10*3/MM3 (ref 3.4–10.8)

## 2023-08-18 PROCEDURE — 81003 URINALYSIS AUTO W/O SCOPE: CPT

## 2023-08-18 PROCEDURE — 80061 LIPID PANEL: CPT

## 2023-08-18 PROCEDURE — 84146 ASSAY OF PROLACTIN: CPT

## 2023-08-18 PROCEDURE — 82306 VITAMIN D 25 HYDROXY: CPT

## 2023-08-18 PROCEDURE — 80053 COMPREHEN METABOLIC PANEL: CPT

## 2023-08-18 PROCEDURE — 85025 COMPLETE CBC W/AUTO DIFF WBC: CPT

## 2023-08-18 PROCEDURE — 36415 COLL VENOUS BLD VENIPUNCTURE: CPT

## 2024-08-14 ENCOUNTER — TRANSCRIBE ORDERS (OUTPATIENT)
Dept: ADMINISTRATIVE | Facility: HOSPITAL | Age: 64
End: 2024-08-14
Payer: MEDICARE

## 2024-08-14 ENCOUNTER — LAB (OUTPATIENT)
Dept: LAB | Facility: HOSPITAL | Age: 64
End: 2024-08-14
Payer: MEDICARE

## 2024-08-14 DIAGNOSIS — E22.1 HYPERPROLACTINEMIA: ICD-10-CM

## 2024-08-14 DIAGNOSIS — E55.9 VITAMIN D DEFICIENCY: ICD-10-CM

## 2024-08-14 DIAGNOSIS — Z00.00 ROUTINE GENERAL MEDICAL EXAMINATION AT A HEALTH CARE FACILITY: ICD-10-CM

## 2024-08-14 DIAGNOSIS — D64.9 ANEMIA, UNSPECIFIED TYPE: ICD-10-CM

## 2024-08-14 DIAGNOSIS — Z00.00 ROUTINE GENERAL MEDICAL EXAMINATION AT A HEALTH CARE FACILITY: Primary | ICD-10-CM

## 2024-08-14 DIAGNOSIS — E03.9 ACQUIRED HYPOTHYROIDISM: ICD-10-CM

## 2024-08-14 LAB
25(OH)D3 SERPL-MCNC: 28.3 NG/ML (ref 30–100)
BASOPHILS # BLD AUTO: 0.04 10*3/MM3 (ref 0–0.2)
BASOPHILS NFR BLD AUTO: 0.7 % (ref 0–1.5)
BILIRUB UR QL STRIP: NEGATIVE
CHOLEST SERPL-MCNC: 206 MG/DL (ref 0–200)
CLARITY UR: CLEAR
COLOR UR: YELLOW
DEPRECATED RDW RBC AUTO: 40.2 FL (ref 37–54)
EOSINOPHIL # BLD AUTO: 0.14 10*3/MM3 (ref 0–0.4)
EOSINOPHIL NFR BLD AUTO: 2.6 % (ref 0.3–6.2)
ERYTHROCYTE [DISTWIDTH] IN BLOOD BY AUTOMATED COUNT: 12.6 % (ref 12.3–15.4)
FOLATE SERPL-MCNC: 19 NG/ML (ref 4.78–24.2)
GLUCOSE UR STRIP-MCNC: NEGATIVE MG/DL
HCT VFR BLD AUTO: 37.7 % (ref 34–46.6)
HDLC SERPL-MCNC: 56 MG/DL (ref 40–60)
HGB BLD-MCNC: 12.3 G/DL (ref 12–15.9)
HGB UR QL STRIP.AUTO: NEGATIVE
IMM GRANULOCYTES # BLD AUTO: 0.01 10*3/MM3 (ref 0–0.05)
IMM GRANULOCYTES NFR BLD AUTO: 0.2 % (ref 0–0.5)
KETONES UR QL STRIP: NEGATIVE
LDLC SERPL CALC-MCNC: 137 MG/DL (ref 0–100)
LDLC/HDLC SERPL: 2.43 {RATIO}
LEUKOCYTE ESTERASE UR QL STRIP.AUTO: NEGATIVE
LYMPHOCYTES # BLD AUTO: 1.86 10*3/MM3 (ref 0.7–3.1)
LYMPHOCYTES NFR BLD AUTO: 34.1 % (ref 19.6–45.3)
MCH RBC QN AUTO: 28.4 PG (ref 26.6–33)
MCHC RBC AUTO-ENTMCNC: 32.6 G/DL (ref 31.5–35.7)
MCV RBC AUTO: 87.1 FL (ref 79–97)
MONOCYTES # BLD AUTO: 0.57 10*3/MM3 (ref 0.1–0.9)
MONOCYTES NFR BLD AUTO: 10.5 % (ref 5–12)
NEUTROPHILS NFR BLD AUTO: 2.83 10*3/MM3 (ref 1.7–7)
NEUTROPHILS NFR BLD AUTO: 51.9 % (ref 42.7–76)
NITRITE UR QL STRIP: NEGATIVE
NRBC BLD AUTO-RTO: 0 /100 WBC (ref 0–0.2)
PH UR STRIP.AUTO: 6.5 [PH] (ref 5–8)
PLATELET # BLD AUTO: 344 10*3/MM3 (ref 140–450)
PMV BLD AUTO: 9.4 FL (ref 6–12)
PROLACTIN SERPL-MCNC: 14.4 NG/ML (ref 4.79–23.3)
PROT UR QL STRIP: NEGATIVE
RBC # BLD AUTO: 4.33 10*6/MM3 (ref 3.77–5.28)
SP GR UR STRIP: 1.02 (ref 1–1.03)
TRIGL SERPL-MCNC: 70 MG/DL (ref 0–150)
TSH SERPL DL<=0.05 MIU/L-ACNC: 2.28 UIU/ML (ref 0.27–4.2)
UROBILINOGEN UR QL STRIP: NORMAL
VIT B12 BLD-MCNC: 395 PG/ML (ref 211–946)
VLDLC SERPL-MCNC: 13 MG/DL (ref 5–40)
WBC NRBC COR # BLD AUTO: 5.45 10*3/MM3 (ref 3.4–10.8)

## 2024-08-14 PROCEDURE — 85025 COMPLETE CBC W/AUTO DIFF WBC: CPT

## 2024-08-14 PROCEDURE — 82306 VITAMIN D 25 HYDROXY: CPT

## 2024-08-14 PROCEDURE — 84443 ASSAY THYROID STIM HORMONE: CPT

## 2024-08-14 PROCEDURE — 80061 LIPID PANEL: CPT

## 2024-08-14 PROCEDURE — 81003 URINALYSIS AUTO W/O SCOPE: CPT

## 2024-08-14 PROCEDURE — 84146 ASSAY OF PROLACTIN: CPT

## 2024-08-14 PROCEDURE — 36415 COLL VENOUS BLD VENIPUNCTURE: CPT

## 2024-08-14 PROCEDURE — 82746 ASSAY OF FOLIC ACID SERUM: CPT

## 2024-08-14 PROCEDURE — 82607 VITAMIN B-12: CPT

## 2024-08-28 ENCOUNTER — TRANSCRIBE ORDERS (OUTPATIENT)
Dept: ADMINISTRATIVE | Facility: HOSPITAL | Age: 64
End: 2024-08-28
Payer: MEDICARE

## 2024-08-28 DIAGNOSIS — Z12.31 VISIT FOR SCREENING MAMMOGRAM: Primary | ICD-10-CM

## 2024-10-14 ENCOUNTER — HOSPITAL ENCOUNTER (OUTPATIENT)
Dept: MAMMOGRAPHY | Facility: HOSPITAL | Age: 64
Discharge: HOME | End: 2024-10-14
Admitting: INTERNAL MEDICINE
Payer: MEDICARE

## 2024-10-14 DIAGNOSIS — Z12.31 VISIT FOR SCREENING MAMMOGRAM: ICD-10-CM

## 2024-10-14 PROCEDURE — 77067 SCR MAMMO BI INCL CAD: CPT

## 2024-10-14 PROCEDURE — 77063 BREAST TOMOSYNTHESIS BI: CPT

## 2024-10-16 ENCOUNTER — TRANSCRIBE ORDERS (OUTPATIENT)
Dept: ADMINISTRATIVE | Facility: HOSPITAL | Age: 64
End: 2024-10-16
Payer: MEDICARE

## 2024-10-16 DIAGNOSIS — R92.8 ABNORMAL MAMMOGRAM: Primary | ICD-10-CM

## 2024-10-28 ENCOUNTER — HOSPITAL ENCOUNTER (OUTPATIENT)
Dept: ULTRASOUND IMAGING | Facility: HOSPITAL | Age: 64
Discharge: HOME | End: 2024-10-28
Payer: MEDICARE

## 2024-10-28 ENCOUNTER — HOSPITAL ENCOUNTER (OUTPATIENT)
Dept: MAMMOGRAPHY | Facility: HOSPITAL | Age: 64
Discharge: HOME | End: 2024-10-28
Payer: MEDICARE

## 2024-10-28 DIAGNOSIS — R92.8 ABNORMAL MAMMOGRAM: ICD-10-CM

## 2024-10-28 PROCEDURE — G0279 TOMOSYNTHESIS, MAMMO: HCPCS

## 2024-10-28 PROCEDURE — 77065 DX MAMMO INCL CAD UNI: CPT

## 2024-10-28 PROCEDURE — 76642 ULTRASOUND BREAST LIMITED: CPT

## 2024-11-07 ENCOUNTER — TRANSCRIBE ORDERS (OUTPATIENT)
Dept: GENERAL RADIOLOGY | Facility: HOSPITAL | Age: 64
End: 2024-11-07
Payer: MEDICARE

## 2024-11-07 DIAGNOSIS — R92.8 ABNORMAL MAMMOGRAM: Primary | ICD-10-CM

## 2024-11-22 NOTE — NURSING NOTE
Precall placed/no answer/VMM left with my name/#/reason for call/left arriv time 1130 @ [facility] Mammo Dept/adv. no Aspirin/NSAIDs/Vit E/Fish oil after today/may take Tylenol/may eat/drink/take meds on list prior/wear bra/call if any questions.

## 2024-11-26 ENCOUNTER — HOSPITAL ENCOUNTER (OUTPATIENT)
Dept: MAMMOGRAPHY | Facility: HOSPITAL | Age: 64
Discharge: HOME | End: 2024-11-26
Admitting: INTERNAL MEDICINE
Payer: MEDICARE

## 2024-11-26 VITALS
DIASTOLIC BLOOD PRESSURE: 76 MMHG | HEIGHT: 65 IN | OXYGEN SATURATION: 98 % | RESPIRATION RATE: 16 BRPM | WEIGHT: 155 LBS | SYSTOLIC BLOOD PRESSURE: 139 MMHG | BODY MASS INDEX: 25.83 KG/M2 | HEART RATE: 66 BPM | TEMPERATURE: 97.3 F

## 2024-11-26 DIAGNOSIS — R92.8 ABNORMAL MAMMOGRAM: ICD-10-CM

## 2024-11-26 PROCEDURE — 88360 TUMOR IMMUNOHISTOCHEM/MANUAL: CPT | Performed by: INTERNAL MEDICINE

## 2024-11-26 PROCEDURE — A4648 IMPLANTABLE TISSUE MARKER: HCPCS

## 2024-11-26 PROCEDURE — 88305 TISSUE EXAM BY PATHOLOGIST: CPT | Performed by: INTERNAL MEDICINE

## 2024-11-26 PROCEDURE — 88342 IMHCHEM/IMCYTCHM 1ST ANTB: CPT | Performed by: INTERNAL MEDICINE

## 2024-11-26 PROCEDURE — 88341 IMHCHEM/IMCYTCHM EA ADD ANTB: CPT | Performed by: INTERNAL MEDICINE

## 2024-11-26 RX ORDER — LIDOCAINE HYDROCHLORIDE 10 MG/ML
1 INJECTION, SOLUTION INFILTRATION; PERINEURAL ONCE
Status: DISCONTINUED | OUTPATIENT
Start: 2024-11-26 | End: 2024-11-27 | Stop reason: HOSPADM

## 2024-11-26 NOTE — NURSING NOTE
Biopsy site to left slightly outer slightly upper breast clear with Dermabond dry and intact. No firmness or swelling noted at or around biopsy site. Denies pain. Ice pack with protective covering applied to biopsy site. Discharge instructions discussed with understanding voiced by patient. Copies provided to patient. No distress noted. To home via private vehicle.

## 2024-11-29 LAB
CYTO UR: NORMAL
LAB AP CASE REPORT: NORMAL
LAB AP DIAGNOSIS COMMENT: NORMAL
LAB AP SPECIAL STAINS: NORMAL
LAB AP SYNOPTIC CHECKLIST: NORMAL
PATH REPORT.FINAL DX SPEC: NORMAL
PATH REPORT.GROSS SPEC: NORMAL

## 2024-12-03 ENCOUNTER — TELEPHONE (OUTPATIENT)
Dept: SURGERY | Facility: CLINIC | Age: 64
End: 2024-12-03
Payer: MEDICARE

## 2024-12-04 DIAGNOSIS — C50.412 MALIGNANT NEOPLASM OF UPPER-OUTER QUADRANT OF LEFT FEMALE BREAST, UNSPECIFIED ESTROGEN RECEPTOR STATUS: Primary | ICD-10-CM

## 2024-12-09 ENCOUNTER — TELEPHONE (OUTPATIENT)
Dept: SURGERY | Facility: CLINIC | Age: 64
End: 2024-12-09
Payer: MEDICARE

## 2024-12-09 NOTE — TELEPHONE ENCOUNTER
----- Message from Anyi KERA sent at 12/9/2024  1:36 PM EST -----  Contact: [phone]  This pts insurance is out of network, she said she has spoken to Edwardo previously about what next steps would be. She called to day and stated that she will have Medicare A&B at the beginning of the year and would like to schedule an appt for after the 1st.    I told her I'm not sure if that is something Dr. Copeland would be okay with though.    Okay thank you,   I go her MRI scheduled Tuesday 1/7/25 12:30 pm arrival [facility]   And consult with Dr Yadi Copeland Mon 1/13/25 9:00.   All confirmed with patient and new patient packet mailed

## 2025-01-30 ENCOUNTER — TELEPHONE (OUTPATIENT)
Dept: SURGERY | Facility: CLINIC | Age: 65
End: 2025-01-30
Payer: MEDICARE

## 2025-01-30 NOTE — TELEPHONE ENCOUNTER
Spoke with patient and was informed that they decided to have their care with Margarita due to Dr. Copeland not having any openings.   Patient stated that there was not issues with the office but she wanted to be seen sooner to ease her mind of the situation.

## 2025-03-03 LAB
CYTO UR: NORMAL
LAB AP CASE REPORT: NORMAL
LAB AP DIAGNOSIS COMMENT: NORMAL
LAB AP SPECIAL STAINS: NORMAL
LAB AP SYNOPTIC CHECKLIST: NORMAL
PATH REPORT.ADDENDUM SPEC: NORMAL
PATH REPORT.FINAL DX SPEC: NORMAL
PATH REPORT.GROSS SPEC: NORMAL

## (undated) DEVICE — EQUIPMENT COVER BAG TYPE 48” X 36” (122CM X 91CM): Brand: EQUIPMENT COVER BAG TYPE

## (undated) DEVICE — 3M™ STERI-STRIP™ REINFORCED ADHESIVE SKIN CLOSURES, R1547, 1/2 IN X 4 IN (12 MM X 100 MM), 6 STRIPS/ENVELOPE: Brand: 3M™ STERI-STRIP™

## (undated) DEVICE — PK KYPHOPLASTY 40

## (undated) DEVICE — MIXER A07A CEMENT

## (undated) DEVICE — TOWEL,OR,DSP,ST,BLUE,STD,4/PK,20PK/CS: Brand: MEDLINE

## (undated) DEVICE — GLV SURG SENSICARE PI MIC PF SZ7.5 LF STRL

## (undated) DEVICE — BONE TAMP KIT KEX152EB FF E2 15/2 OI: Brand: KYPHON EXPRESS II KYPHOPAK TRAY

## (undated) DEVICE — NEEDLE, QUINCKE 22GX3.5": Brand: MEDLINE INDUSTRIES, INC.

## (undated) DEVICE — CEMENT GUN AND BONE FILLER CDS2A SISE 2: Brand: MEDTRONIC REUSABLE INSTRUMENTS

## (undated) DEVICE — BONE BIOPSY DEVICE F07A TAPERED SIZE 2: Brand: MEDTRONIC REUSABLE INSTRUMENTS

## (undated) DEVICE — APPL CHLORAPREP HI/LITE 26ML ORNG

## (undated) DEVICE — CEMENT CARTRIDGES CC02A CDS: Brand: KYPHON® CEMENT DELIVERY SYSTEM